# Patient Record
Sex: MALE | Race: WHITE | NOT HISPANIC OR LATINO | Employment: OTHER | URBAN - METROPOLITAN AREA
[De-identification: names, ages, dates, MRNs, and addresses within clinical notes are randomized per-mention and may not be internally consistent; named-entity substitution may affect disease eponyms.]

---

## 2017-04-03 ENCOUNTER — ALLSCRIPTS OFFICE VISIT (OUTPATIENT)
Dept: OTHER | Facility: OTHER | Age: 57
End: 2017-04-03

## 2017-05-24 ENCOUNTER — GENERIC CONVERSION - ENCOUNTER (OUTPATIENT)
Dept: OTHER | Facility: OTHER | Age: 57
End: 2017-05-24

## 2017-08-28 ENCOUNTER — ALLSCRIPTS OFFICE VISIT (OUTPATIENT)
Dept: OTHER | Facility: OTHER | Age: 57
End: 2017-08-28

## 2017-11-03 ENCOUNTER — ALLSCRIPTS OFFICE VISIT (OUTPATIENT)
Dept: OTHER | Facility: OTHER | Age: 57
End: 2017-11-03

## 2017-11-04 NOTE — CONSULTS
Assessment  1  GERD without esophagitis (530 81) (K21 9)   2  Dysphagia (787 20) (R13 10)   3  Chronic diarrhea (787 91) (K52 9)   4  Family history of colonic polyps (V18 51) (Z83 71) : Father   5  Rectal bleeding (569 3) (K62 5)    Plan  Chronic diarrhea, Dysphagia, GERD without esophagitis    · Follow Up After Tests Complete Evaluation and Treatment  Follow-up  Status: Hold For -  Scheduling  Requested for: 79GRS0388   Ordered; For: Chronic diarrhea, Dysphagia, GERD without esophagitis; Ordered By: Ana Zuniga Performed:  Due: 65APO9582   · EGD; Status:Hold For - Scheduling; Requested GZY:34QYQ4465;    Perform:[facility]; Order Comments:west; AMELIAZ:[id number];MGIESKN; For:Chronic diarrhea, Dysphagia, GERD without esophagitis; Ordered By:Kriss Kumari;  Chronic diarrhea, Rectal bleeding    · MoviPrep 100 GM Oral Solution Reconstituted; USE AS DIRECTED   Rx By: Ana Zuniga; Dispense: 0 Days ; #:1 Solution Reconstituted; Refill: 0;For: Chronic diarrhea, Rectal bleeding; CHRISTINA = N; Verified Transmission to [address] ; Last Updated By: System, [email or web address]; 11/3/2017 3:47:31 PM   · COLONOSCOPY (GI, SURG); Status:Hold For - Scheduling; Requested MID:29LBQ8117;    Perform:[facility]; Order Comments:west; RAC:[id number]; Ordered; For:Chronic diarrhea, Rectal bleeding; Ordered By:Kriss Kumari;    Discussion/Summary  Discussion Summary:   He presents for evaluation because of reflux, dysphagia, chronic diarrhea, and rectal bleeding  Since his last upper endoscopy and colonoscopy were about 8 years ago, I will schedule her for an upper endoscopy and colonoscopy to evaluate this further  During the procedures I will biopsies duodenum for celiac sprue in his colon for microscopic colitis  I spoke to him about the benefits and risks of the procedures as well as instructions for the bowel preparation and answered all of his questions        Chief Complaint  Chief Complaint Free Text Note Form: Pt consult for colon; complains of occasional diarrhea and reflux  History of Present Illness  HPI: He presents for evaluation because of chronic reflux symptoms and intermittent diarrhea  He is with his recent work has been managed with over-the-counter antacids but he has not been taking irregular proton pump inhibitor  He has also had a few episodes of dysphagia that is resolved on its own  He denies nausea, vomiting, abdominal pain, constipation, bleeding, and weight loss  He complains of intermittent diarrhea that seemed to be related to dietary changes such as lactose exposure  He believes his last upper endoscopy and colonoscopy were unremarkable about 8 years ago  History Reviewed: The history was obtained today from the patient and I agree with the documented history  Review of Systems  Complete-Male GI Adult:   Constitutional: No fever or chills, feels well, no tiredness, no recent weight gain or weight loss  Eyes: No complaints of eye pain, no red eyes, no discharge from eyes, no itchy eyes  ENT: no complaints of earache, no hearing loss, no nosebleeds, no nasal discharge, no sore throat, no hoarseness  Cardiovascular: No complaints of slow heart rate, no fast heart rate, no chest pain, no palpitations, no leg claudication, no lower extremity  Respiratory: No complaints of shortness of breath, no wheezing, no cough, no SOB on exertion, no orthopnea or PND  Gastrointestinal: diarrhea-- and-- reflux, but-- as noted in HPI  Genitourinary: No complaints of dysuria, no incontinence, no hesitancy, no nocturia, no genital lesion, no testicular pain  Musculoskeletal: No complaints of arthralgia, no myalgias, no joint swelling or stiffness, no limb pain or swelling  Integumentary: No complaints of skin rash or skin lesions, no itching, no skin wound, no dry skin     Neurological: No compliants of headache, no confusion, no convulsions, no numbness or tingling, no dizziness or fainting, no limb weakness, no difficulty walking  Psychiatric: Is not suicidal, no sleep disturbances, no anxiety or depression, no change in personality, no emotional problems  Endocrine: No complaints of proptosis, no hot flashes, no muscle weakness, no erectile dysfunction, no deepening of the voice, no feelings of weakness  Hematologic/Lymphatic: No complaints of swollen glands, no swollen glands in the neck, does not bleed easily, no easy bruising  ROS Reviewed:   ROS reviewed  Active Problems  1  Allergic rhinitis (477 9) (J30 9)   2  Circadian rhythm sleep disorder, shift work type (327 36) (G47 26)   3  Dizziness (780 4) (R42)   4  Encounter for prostate cancer screening (V76 44) (Z12 5)   5  Encounter for screening colonoscopy (V76 51) (Z12 11)   6  Polyneuropathy, idiopathic, progressive (356 4) (G60 3)   7  Screening for lipoid disorders (V77 91) (Z13 220)    Past Medical History  1  History of Carrier Of STD (V02 8)   2  History of allergy (V15 09) (Z88 9)   3  Denied: History of Mental Status Change  Active Problems And Past Medical History Reviewed: The active problems and past medical history were reviewed and updated today  Surgical History  1  History of Tonsillectomy With Adenoidectomy  Surgical History Reviewed: The surgical history was reviewed and updated today  Family History  Father    1  Family history of colonic polyps (V18 51) (Z83 71)  Sister    2  Family history of Type 2 Diabetes Mellitus  Family History Reviewed: The family history was reviewed and updated today  Social History   · Exercising Regularly   · Marital History - Currently    · Never A Smoker   · Never Used Drugs   · Occupation:  Social History Reviewed: The social history was reviewed and updated today  Current Meds   1  Multi Vitamin Mens Oral Tablet; Therapy: 58JHA0449 to Recorded   2  SM Calcium-Vitamin D 600-400 MG-UNIT Oral Tablet; Therapy: 42AKS3126 to Recorded   3   ValACYclovir HCl - 500 MG Oral Tablet; TAKE 1 TABLET DAILY AS DIRECTED; Therapy: 39Tsy2848 to (Evaluate:62Jno9404)  Requested for: 56Vgp8865; Last   Rx:92Uym3899 Ordered  Medication List Reviewed: The medication list was reviewed and updated today  Allergies  1  No Known Drug Allergies    Vitals  Vital Signs    Recorded: 34XJQ7254 03:12PM   Temperature 98 F, Tympanic   Heart Rate 85   Systolic 846, LUE, Sitting   Diastolic 78, LUE, Sitting   BP CUFF SIZE Large   Height 5 ft 9 in   Weight 201 lb 2 oz   BMI Calculated 29 7   BSA Calculated 2 07   O2 Saturation 98, RA     Physical Exam    Constitutional   General appearance: No acute distress, well appearing and well nourished  Eyes   Conjunctiva and lids: No swelling, erythema, or discharge  Pupils and irises: Equal, round and reactive to light  Ears, Nose, Mouth, and Throat   External inspection of ears and nose: Normal     Nasal mucosa, septum, and turbinates: Normal without edema or erythema  Oropharynx: Normal with no erythema, edema, exudate or lesions  Pulmonary   Respiratory effort: No increased work of breathing or signs of respiratory distress  Auscultation of lungs: Clear to auscultation, equal breath sounds bilaterally, no wheezes, no rales, no rhonci  Cardiovascular   Auscultation of heart: Normal rate and rhythm, normal S1 and S2, without murmurs  Examination of extremities for edema and/or varicosities: Normal     Abdomen   Abdomen: Non-tender, no masses  Liver and spleen: No hepatomegaly or splenomegaly  Lymphatic   Palpation of lymph nodes in neck: No lymphadenopathy  Skin   Skin and subcutaneous tissue: Normal without rashes or lesions  Neurologic   Cranial nerves: Cranial nerves 2-12 intact  Reflexes: 2+ and symmetric  Sensation: No sensory loss      Psychiatric   Orientation to person, place and time: Normal     Mood and affect: Normal          Signatures   Electronically signed by : Jai Carmona MD; Nov  3 2017 3:48PM EST                       (Author)

## 2017-11-07 ENCOUNTER — ANESTHESIA EVENT (OUTPATIENT)
Dept: PERIOP | Facility: AMBULARY SURGERY CENTER | Age: 57
End: 2017-11-07
Payer: COMMERCIAL

## 2017-12-05 ENCOUNTER — HOSPITAL ENCOUNTER (OUTPATIENT)
Facility: AMBULARY SURGERY CENTER | Age: 57
Setting detail: OUTPATIENT SURGERY
Discharge: HOME | End: 2017-12-05
Attending: INTERNAL MEDICINE | Admitting: INTERNAL MEDICINE
Payer: COMMERCIAL

## 2017-12-05 ENCOUNTER — ANESTHESIA (OUTPATIENT)
Dept: PERIOP | Facility: AMBULARY SURGERY CENTER | Age: 57
End: 2017-12-05
Payer: COMMERCIAL

## 2017-12-05 ENCOUNTER — GENERIC CONVERSION - ENCOUNTER (OUTPATIENT)
Dept: GASTROENTEROLOGY | Facility: CLINIC | Age: 57
End: 2017-12-05

## 2017-12-05 VITALS
OXYGEN SATURATION: 100 % | HEIGHT: 68 IN | SYSTOLIC BLOOD PRESSURE: 129 MMHG | TEMPERATURE: 97 F | BODY MASS INDEX: 29.7 KG/M2 | WEIGHT: 196 LBS | DIASTOLIC BLOOD PRESSURE: 85 MMHG | RESPIRATION RATE: 20 BRPM | HEART RATE: 66 BPM

## 2017-12-05 DIAGNOSIS — K52.9 CHRONIC DIARRHEA: ICD-10-CM

## 2017-12-05 DIAGNOSIS — R13.10 DYSPHAGIA: ICD-10-CM

## 2017-12-05 DIAGNOSIS — K21.9 GERD WITHOUT ESOPHAGITIS: ICD-10-CM

## 2017-12-05 PROCEDURE — 88305 TISSUE EXAM BY PATHOLOGIST: CPT | Performed by: INTERNAL MEDICINE

## 2017-12-05 RX ORDER — SODIUM CHLORIDE, SODIUM LACTATE, POTASSIUM CHLORIDE, CALCIUM CHLORIDE 600; 310; 30; 20 MG/100ML; MG/100ML; MG/100ML; MG/100ML
INJECTION, SOLUTION INTRAVENOUS CONTINUOUS PRN
Status: DISCONTINUED | OUTPATIENT
Start: 2017-12-05 | End: 2017-12-05 | Stop reason: SURG

## 2017-12-05 RX ORDER — PROPOFOL 10 MG/ML
INJECTION, EMULSION INTRAVENOUS AS NEEDED
Status: DISCONTINUED | OUTPATIENT
Start: 2017-12-05 | End: 2017-12-05 | Stop reason: SURG

## 2017-12-05 RX ORDER — OMEPRAZOLE 20 MG/1
20 CAPSULE, DELAYED RELEASE ORAL
Qty: 30 CAPSULE | Refills: 5 | Status: SHIPPED | OUTPATIENT
Start: 2017-12-05 | End: 2021-09-14

## 2017-12-05 RX ORDER — VALACYCLOVIR HYDROCHLORIDE 500 MG/1
500 TABLET, FILM COATED ORAL 2 TIMES DAILY
COMMUNITY
End: 2018-05-01 | Stop reason: SDUPTHER

## 2017-12-05 RX ORDER — MULTIVITAMIN WITH IRON
1 TABLET ORAL DAILY
COMMUNITY

## 2017-12-05 RX ORDER — DIPHENOXYLATE HYDROCHLORIDE AND ATROPINE SULFATE 2.5; .025 MG/1; MG/1
1 TABLET ORAL DAILY
COMMUNITY
End: 2021-09-14 | Stop reason: SDUPTHER

## 2017-12-05 RX ORDER — LIDOCAINE HYDROCHLORIDE 10 MG/ML
INJECTION, SOLUTION INFILTRATION; PERINEURAL AS NEEDED
Status: DISCONTINUED | OUTPATIENT
Start: 2017-12-05 | End: 2017-12-05 | Stop reason: SURG

## 2017-12-05 RX ADMIN — PROPOFOL 50 MG: 10 INJECTION, EMULSION INTRAVENOUS at 08:57

## 2017-12-05 RX ADMIN — PROPOFOL 50 MG: 10 INJECTION, EMULSION INTRAVENOUS at 08:48

## 2017-12-05 RX ADMIN — LIDOCAINE HYDROCHLORIDE 50 MG: 10 INJECTION, SOLUTION INFILTRATION; PERINEURAL at 08:42

## 2017-12-05 RX ADMIN — PROPOFOL 50 MG: 10 INJECTION, EMULSION INTRAVENOUS at 09:03

## 2017-12-05 RX ADMIN — PROPOFOL 50 MG: 10 INJECTION, EMULSION INTRAVENOUS at 09:00

## 2017-12-05 RX ADMIN — PROPOFOL 100 MG: 10 INJECTION, EMULSION INTRAVENOUS at 08:42

## 2017-12-05 RX ADMIN — PROPOFOL 50 MG: 10 INJECTION, EMULSION INTRAVENOUS at 08:51

## 2017-12-05 RX ADMIN — SODIUM CHLORIDE, SODIUM LACTATE, POTASSIUM CHLORIDE, AND CALCIUM CHLORIDE: .6; .31; .03; .02 INJECTION, SOLUTION INTRAVENOUS at 08:38

## 2017-12-05 RX ADMIN — PROPOFOL 50 MG: 10 INJECTION, EMULSION INTRAVENOUS at 09:06

## 2017-12-05 RX ADMIN — PROPOFOL 50 MG: 10 INJECTION, EMULSION INTRAVENOUS at 08:53

## 2017-12-05 RX ADMIN — PROPOFOL 50 MG: 10 INJECTION, EMULSION INTRAVENOUS at 08:45

## 2017-12-05 RX ADMIN — PROPOFOL 50 MG: 10 INJECTION, EMULSION INTRAVENOUS at 09:08

## 2017-12-05 RX ADMIN — PROPOFOL 50 MG: 10 INJECTION, EMULSION INTRAVENOUS at 09:11

## 2017-12-05 NOTE — DISCHARGE INSTRUCTIONS

## 2017-12-05 NOTE — ANESTHESIA PREPROCEDURE EVALUATION
Review of Systems/Medical History      No history of anesthetic complications     Cardiovascular  Negative cardio ROS   Comment: Per pt, recent negative stress test,  Pulmonary  ,   Comment: Former smoker     GI/Hepatic    GERD ,        Negative  ROS        Endo/Other  Negative endo/other ROS      GYN       Hematology  Negative hematology ROS      Musculoskeletal  Negative musculoskeletal ROS        Neurology  Negative neurology ROS      Psychology           Physical Exam    Airway       Dental       Cardiovascular  Comment: Negative ROS,     Pulmonary      Other Findings        Anesthesia Plan  ASA Score- 2       Anesthesia Type- IV sedation with anesthesia with ASA Monitors  Additional Monitors:   Airway Plan:     Comment: IV sedation,  standard ASA monitors  Risks and benefits discussed with patient; patient consented and agrees to proceed  I saw and evaluated the patient  If seen with CRNA, we have discussed the anesthetic plan and I am in agreement that the plan is appropriate for the patient          Induction- intravenous  Informed Consent- Anesthetic plan and risks discussed with patient

## 2017-12-05 NOTE — H&P
History and Physical - [facility]  Nnifa Solis 49 y o  male MRN: [id number]                  HPI: Ninfa Solis is a 49y o  year old male who presents for dysphagia, abdominal pain, diarrhea, and rectal bleeding  REVIEW OF SYSTEMS: Per the HPI, and otherwise unremarkable  Historical Information   Past Medical History:   Diagnosis Date    Shingles      Past Surgical History:   Procedure Laterality Date    TONSILLECTOMY       Social History   History   Alcohol Use    Yes     Comment: once a week     History   Drug Use No     History   Smoking Status    Former Smoker   Smokeless Tobacco    Never Used     History reviewed  No pertinent family history  Meds/Allergies     Prescriptions Prior to Admission   Medication    B Complex-C (B-COMPLEX WITH VITAMIN C) tablet    famotidine (PEPCID AC) 10 MG chewable tablet    multivitamin (THERAGRAN) TABS    valACYclovir (VALTREX) 500 mg tablet       No Known Allergies    Objective     Blood pressure 144/89, pulse 82, temperature 97 6 °F (36 4 °C), temperature source Temporal, resp  rate 20, height 5' 8" (1 727 m), weight 88 9 kg (196 lb), SpO2 98 %  PHYSICAL EXAM    Gen: NAD  CV: RRR  CHEST: Clear  ABD: soft, NT/ND  EXT: no edema  Neuro: AAO      ASSESSMENT/PLAN:  This is a 49y o  year old male here for dysphagia, abdominal pain, diarrhea, and rectal bleeding  PLAN:   Procedure:  Upper endoscopy and colonoscopy

## 2017-12-05 NOTE — OP NOTE
OPERATIVE REPORT  PATIENT NAME: Lyndon Wakefield    :  1960  MRN: [id number]  Pt Location: [facility]  GI ROOM 01    SURGERY DATE: 2017    Surgeon(s) and Role:     * Faizan Powell MD - Primary    ESOPHAGOGASTRODUODENOSCOPY    PROCEDURE: EGD    SEDATION: Monitored anesthesia care, check anesthesia records    ASA Class: 2    INDICATIONS:  Reflux and dysphagia    CONSENT:  Informed consent was obtained for the procedure, including sedation after explaining the risks and benefits of the procedure  Risks including but not limited to bleeding, perforation, infection, and missed lesion  PREPARATION:   Telemetry, pulse oximetry, blood pressure were monitored throughout the procedure  Patient was identified by myself both verbally and by visual inspection of ID band  DESCRIPTION:   Patient was placed in the left lateral decubitus position and was sedated with the above medication  The gastroscope was introduced in to the oropharynx and the esophagus was intubated under direct visualization  Scope was passed down the esophagus up to 2nd part of the duodenum  A careful inspection was made as the gastroscope was withdrawn, including a retroflexed view of the stomach; findings and interventions are described below  FINDINGS:    #1  Esophagus-there were two polyps in the esophagus that were biopsied  Random biopsies were taken from the proximal esophagus to rule out eosinophilic esophagitis  LA class B erosive reflux esophagitis was noted in the distal esophagus as well as possible Zurita's esophagus which was biopsied  #2Louvella Laser is a small hiatal hernia  Retroflexed view of the stomach was unremarkable  #3  Duodenum-normal   Random biopsies were taken from the duodenum to rule out celiac sprue           IMPRESSIONS:      Esophageal polyps  Reflux esophagitis  Possible Zurita's esophagus  Hiatal hernia    RECOMMENDATIONS:     Await pathology results  Colonoscopy to follow  Take omeprazole twice a day    COMPLICATIONS:  None; patient tolerated the procedure well            DISPOSITION: PACU           CONDITION: Stable      SIGNATURE: Juventino Johnson MD  DATE: December 5, 2017  TIME: 9:23 AM

## 2017-12-05 NOTE — ANESTHESIA POSTPROCEDURE EVALUATION
Post-Op Assessment Note      CV Status:  Stable    Mental Status:  Alert and awake    Hydration Status:  Euvolemic    PONV Controlled:  Controlled    Airway Patency:  Patent    Post Op Vitals Reviewed: Yes          Staff: Anesthesiologist       Comments: vss, report to rn          /57 (12/05/17 0928)    Temp     Pulse 83 (12/05/17 0928)   Resp 18 (12/05/17 0928)    SpO2 97 % (12/05/17 0928)

## 2017-12-05 NOTE — OP NOTE
OPERATIVE REPORT  PATIENT NAME: Gerry Osorio    :  1960  MRN: [id number]  Pt Location: [facility]  GI ROOM 01    SURGERY DATE: 2017    Surgeon(s) and Role:     * Ni Yates MD - Primary    Colonoscopy Procedure Note    Procedure: Colonoscopy    Sedation: Monitored anesthesia care, check anesthesia records      ASA Class: 2    INDICATIONS:  Family history of colon cancer, chronic diarrhea, and rectal bleeding    POST-OP DIAGNOSIS: See the impression below    Procedure Details     Prior colonoscopy: No prior colonoscopy  Informed consent was obtained for the procedure, including sedation  Risks of perforation, hemorrhage, adverse drug reaction and aspiration were discussed  The patient was placed in the left lateral decubitus position  Based on the pre-procedure assessment, including review of the patient's medical history, medications, allergies, and review of systems, he had been deemed to be an appropriate candidate for conscious sedation; he was therefore sedated with the medications listed below  The patient was monitored continuously with telemetry, pulse oximetry, blood pressure monitoring, and direct observations  A rectal examination was performed  The colonoscope was inserted into the rectum and advanced under direct vision to the cecum, which was identified by the ileocecal valve and appendiceal orifice  The quality of the colonic preparation was good  A careful inspection was made as the colonoscope was withdrawn, including a retroflexed view of the rectum; findings and interventions are described below  Findings:  Diverticulosis was seen throughout the colon  There was a possible inverted diverticulum versus polyp in the transverse colon that was biopsied  A 6 mm sessile polyp was removed from the sigmoid colon with cold snare polypectomy  Retroflexed view was unremarkable in the rectum             Complications:  None; patient tolerated the procedure well     Impression:    Diverticulosis  Colon polyp  Inverted diverticulum versus colon polyp in the transverse colon    Recommendations:  Repeat colonoscopy in 5 years or sooner if clinically indicated  Repeat colonoscopy is being recommended at an interval of less than 10 years, this is because of family history of colon cancer      SIGNATURE: Mali Matias MD  DATE: December 5, 2017  TIME: 9:28 AM

## 2017-12-09 ENCOUNTER — GENERIC CONVERSION - ENCOUNTER (OUTPATIENT)
Dept: OTHER | Facility: OTHER | Age: 57
End: 2017-12-09

## 2017-12-19 ENCOUNTER — GENERIC CONVERSION - ENCOUNTER (OUTPATIENT)
Dept: OTHER | Facility: OTHER | Age: 57
End: 2017-12-19

## 2018-01-12 NOTE — MISCELLANEOUS
Message  GI Reminder Recall [facility]:   Date: 05/24/2017   Dear Zuri Roman:     Review of our records shows you are due for the following: Colonoscopy  Our records indicate that you are due at this time to have a follow-up examination for a colonoscopy  As you now, these tests are done to prevent colon cancer, a very common disease in the [place] and responsible for the thousands of patient deaths each year  We at [facility] are concerned for your health, and would very much appreciate you getting in touch with us at your earliest convenience, Again, this examination is vital to your proper health maintenance and for the prevention of cancer  We have been trying to contact you and have been unsuccessful  Please contact our office at your earliest convenience to schedule your procedure  Thank you       Please call the following office to schedule your appointment:   [address] [place] [address], Delmy [address] [phone]  We look forward to hearing from you!      Sincerely,         Signatures   Electronically signed by : David Flmeing, ; May 24 2017  8:53AM EST                       (Author)

## 2018-01-13 VITALS
BODY MASS INDEX: 29.79 KG/M2 | TEMPERATURE: 98 F | HEART RATE: 85 BPM | WEIGHT: 201.13 LBS | DIASTOLIC BLOOD PRESSURE: 78 MMHG | HEIGHT: 69 IN | SYSTOLIC BLOOD PRESSURE: 120 MMHG | OXYGEN SATURATION: 98 %

## 2018-01-14 VITALS
HEIGHT: 69 IN | BODY MASS INDEX: 30.21 KG/M2 | SYSTOLIC BLOOD PRESSURE: 124 MMHG | DIASTOLIC BLOOD PRESSURE: 68 MMHG | WEIGHT: 204 LBS

## 2018-01-14 VITALS
BODY MASS INDEX: 29.23 KG/M2 | HEIGHT: 69 IN | DIASTOLIC BLOOD PRESSURE: 80 MMHG | SYSTOLIC BLOOD PRESSURE: 130 MMHG | WEIGHT: 197.38 LBS | TEMPERATURE: 98.2 F

## 2018-01-23 NOTE — RESULT NOTES
Verified Results  (1) TISSUE EXAM [id number] 08:46AM Lenin Lucas     Test Name Result Flag Reference   LAB AP CASE REPORT (Report)     Surgical Pathology Report             Case: [id number]                   Authorizing Provider: Kulwant Arias MD      Collected:      12/05/2017 0846        Ordering Location:   [facility]    Received:      12/06/2017 [address]                             Pathologist:      Kumar Lemon MD                             Specimens:  A) - Duodenum, duodenum bx                                       B) - Esophagus, distal esophagus bx                                  C) - Esophagus, proximal esophagus bx                                 D) - Polyp, Esophageal, esophageal polyps                               E) - Colon, transverse colon bx                                    F) - Polyp, Stomach/Small Intestine, sigmoid polyp cold snare   LAB AP FINAL DIAGNOSIS (Report)     A  Duodenum, biopsy:  - Benign duodenal mucosa  - No villous atrophy, intraepithelial lymphocytosis or crypt hyperplasia;   negative for features of malabsorptive enteropathy   - Negative for chronic or active duodenitis, dysplasia or malignancy  B  Esophagus, distal, biopsy:  - Gastroesophageal junctional mucosa with mixed inflammation and mild   reactive change consistent with gastroesophagitis  - Negative for intestinal metaplasia (Zurita's esophagus), dysplasia or   carcinoma  C  Esophagus, proximal, biopsy:  - Esophageal squamous mucosa with increased intraepithelial eosinophils   (up to 10 per high-power field) (see note)  - Negative for dysplasia or carcinoma  D  Esophagus, polyps, biopsies:  - Portions of reactive squamous mucosa with mild inflammation and   scattered eosinophils, negative for distinct features of polyp   - Negative for dysplasia or carcinoma      E  Colon, transverse, biopsy:  - Portion of reactive colonic mucosa with increased inflammation and   lymphoid aggregates, cannot exclude small inflammatory pseudopolyp   - Negative for aspirate or carcinoma  F  Colon, sigmoid polyp, biopsy:   - Tubular adenoma, negative for high-grade dysplasia  Electronically signed by Angela Perry MD on 12/8/2017 at 3:14 PM   LAB AP NOTE (Report)     The proximal esophagus biopsy shows reactive squamous mucosa with   increased intraepithelial eosinophils (up to 10 per high-power field)  The   finding raises a suspicion for eosinophilic esophagitis, though was not   definitively diagnostic  Alternative etiologies for increased   intraepithelial eosinophils may include drug effect, reflux esophagitis,   infection, or allergy  Correlation with clinical progression is   recommended  LAB AP SURGICAL ADDITIONAL INFORMATION (Report)     All controls performed with the immunohistochemical stains reported above   reacted appropriately  These tests were developed and their performance   characteristics determined by [facility] or   [facility]  They may not be cleared or approved by the [place]  Food and Drug Administration  The FDA has determined that such clearance   or approval is not necessary  These tests are used for clinical purposes  They should not be regarded as investigational or for research  This   laboratory has been approved by CLIA 88, designated as a high-complexity   laboratory and is qualified to perform these tests  LAB AP GROSS DESCRIPTION (Report)     A  The specimen is received in formalin, labeled with the patient's name   and hospital number, and is designated duodenum biopsy, are two   irregularly shaped fragments of tan soft tissue measuring 0 3 and 0 2 cm   in greatest dimension  Entirely submitted  One cassette  B   The specimen is received in formalin, labeled with the patient's name   and hospital number, and is designated Distal esophagus biopsy, is a   single irregularly shaped fragment of tan soft tissue measuring 0 4 cm in   greatest dimension  Entirely submitted  One cassette  C  The specimen is received in formalin, labeled with the patient's name   and hospital number, and is designated Proximal esophagus biopsy, are   two irregularly shaped fragments of tan soft tissue measuring 0 3 and 0 2   cm in greatest dimension  Entirely submitted  One cassette  D  The specimen is received in formalin, labeled with the patient's name   and hospital number, and is designated Esophageal polyp, is a single   irregularly shaped fragment of tan soft tissue measuring 0 4 cm in   greatest dimension  Entirely submitted  One cassette  E  The specimen is received in formalin, labeled with the patient's name   and hospital number, and is designated Transverse colon biopsy, are two   irregularly shaped fragments of tan soft tissue measuring 0 3 and 0 2 cm   in greatest dimension  Entirely submitted  One cassette  F  The specimen is received in formalin, labeled with the patient's name   and hospital number, and is designated Sigmoid polyp cold snare, is a   single irregularly shaped fragment of tan soft tissue measuring 0 3 cm in   greatest dimension  Entirely submitted  One cassette  Note: The estimated total formalin fixation time based upon information   provided by the submitting clinician and the standard processing schedule   is less than 72 hours      Justice BAEZA AP CLINICAL INFORMATION R/o celiac     R/o celiac

## 2018-01-24 VITALS
HEART RATE: 74 BPM | DIASTOLIC BLOOD PRESSURE: 82 MMHG | TEMPERATURE: 98 F | HEIGHT: 69 IN | SYSTOLIC BLOOD PRESSURE: 130 MMHG | BODY MASS INDEX: 29.92 KG/M2 | WEIGHT: 202 LBS | OXYGEN SATURATION: 98 %

## 2018-05-01 ENCOUNTER — OFFICE VISIT (OUTPATIENT)
Dept: FAMILY MEDICINE CLINIC | Facility: CLINIC | Age: 58
End: 2018-05-01
Payer: COMMERCIAL

## 2018-05-01 VITALS
SYSTOLIC BLOOD PRESSURE: 118 MMHG | OXYGEN SATURATION: 94 % | HEART RATE: 93 BPM | WEIGHT: 205 LBS | BODY MASS INDEX: 30.36 KG/M2 | DIASTOLIC BLOOD PRESSURE: 72 MMHG | HEIGHT: 69 IN | TEMPERATURE: 98.4 F

## 2018-05-01 DIAGNOSIS — B00.1 COLD SORE: ICD-10-CM

## 2018-05-01 DIAGNOSIS — K21.9 GERD WITHOUT ESOPHAGITIS: ICD-10-CM

## 2018-05-01 DIAGNOSIS — G47.26 CIRCADIAN RHYTHM SLEEP DISORDER, SHIFT WORK TYPE: Primary | ICD-10-CM

## 2018-05-01 PROCEDURE — 1036F TOBACCO NON-USER: CPT | Performed by: FAMILY MEDICINE

## 2018-05-01 PROCEDURE — 99214 OFFICE O/P EST MOD 30 MIN: CPT | Performed by: FAMILY MEDICINE

## 2018-05-01 PROCEDURE — 3008F BODY MASS INDEX DOCD: CPT | Performed by: FAMILY MEDICINE

## 2018-05-01 RX ORDER — ARMODAFINIL 150 MG/1
150 TABLET ORAL DAILY
Qty: 30 TABLET | Refills: 2 | Status: SHIPPED | OUTPATIENT
Start: 2018-05-01 | End: 2018-05-02 | Stop reason: CLARIF

## 2018-05-01 RX ORDER — DIPHENOXYLATE HYDROCHLORIDE AND ATROPINE SULFATE 2.5; .025 MG/1; MG/1
1 TABLET ORAL
COMMUNITY

## 2018-05-01 RX ORDER — FLUTICASONE PROPIONATE 50 MCG
2 SPRAY, SUSPENSION (ML) NASAL AS NEEDED
COMMUNITY

## 2018-05-01 RX ORDER — VALACYCLOVIR HYDROCHLORIDE 1 G/1
1000 TABLET, FILM COATED ORAL DAILY
Qty: 90 TABLET | Refills: 1 | Status: SHIPPED | OUTPATIENT
Start: 2018-05-01 | End: 2018-12-04 | Stop reason: SDUPTHER

## 2018-05-01 NOTE — ASSESSMENT & PLAN NOTE
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

## 2018-05-01 NOTE — PROGRESS NOTES
Assessment/Plan:    Circadian rhythm sleep disorder, shift work type  Continues to have problems with sleeping even though he is no longer doing shift work  Would like to try medication to help  Cold sore    Currently on 500 mg of fell psych liver daily  Still having breakouts  Will increase to 1 g daily  Diagnoses and all orders for this visit:    Circadian rhythm sleep disorder, shift work type  -     Armodafinil (NUVIGIL) 150 MG tablet; Take 1 tablet (150 mg total) by mouth daily for 90 days  -     CBC  -     Comprehensive metabolic panel  -     Lipid Panel With Direct LDL  -     TSH, 3rd generation    Cold sore  -     valACYclovir (VALTREX) 1,000 mg tablet; Take 1 tablet (1,000 mg total) by mouth daily for 180 days    GERD without esophagitis    Other orders  -     Calcium Carbonate-Vitamin D3 (SM CALCIUM-VITAMIN D) 600-400 MG-UNIT TABS; Take by mouth  -     fluticasone (FLONASE) 50 mcg/act nasal spray; 2 sprays into each nostril  -     multivitamin (THERAGRAN) TABS; Take 1 tablet by mouth          Subjective:      Patient ID: Lyndon Wakefield is a 56 y o  male  Patient presents with: Follow-up: f/u on insomnia requests to take the medication nuvigil once daily 250mg to stay alert during the day and cut out caffine  Medication Refill            The following portions of the patient's history were reviewed and updated as appropriate: allergies, current medications, past family history, past medical history, past social history, past surgical history and problem list     Review of Systems   Constitutional: Negative  HENT: Negative  Eyes: Negative  Respiratory: Negative  Cardiovascular: Negative  Gastrointestinal: Negative  Endocrine: Negative  Genitourinary: Negative  Musculoskeletal: Negative  Skin: Negative  Allergic/Immunologic: Negative  Neurological: Negative  Hematological: Negative  Psychiatric/Behavioral: Negative      All other systems reviewed and are negative  Objective:      /72   Pulse 93   Temp 98 4 °F (36 9 °C)   Ht 5' 9" (1 753 m)   Wt 93 kg (205 lb)   SpO2 94%   BMI 30 27 kg/m²          Physical Exam   Constitutional: He is oriented to person, place, and time  He appears well-developed and well-nourished  HENT:   Head: Normocephalic and atraumatic  Right Ear: External ear normal    Left Ear: External ear normal    Nose: Nose normal    Mouth/Throat: Oropharynx is clear and moist    Eyes: Conjunctivae and EOM are normal  Pupils are equal, round, and reactive to light  Neck: Normal range of motion  Neck supple  Cardiovascular: Normal rate, regular rhythm and normal heart sounds  Pulmonary/Chest: Effort normal and breath sounds normal    Abdominal: Soft  Bowel sounds are normal    Musculoskeletal: Normal range of motion  Neurological: He is alert and oriented to person, place, and time  He has normal reflexes  Skin: Skin is warm and dry  Psychiatric: He has a normal mood and affect  His behavior is normal    Nursing note and vitals reviewed

## 2018-05-02 ENCOUNTER — TELEPHONE (OUTPATIENT)
Dept: FAMILY MEDICINE CLINIC | Facility: CLINIC | Age: 58
End: 2018-05-02

## 2018-05-02 DIAGNOSIS — G47.26 CIRCADIAN RHYTHM SLEEP DISORDER, SHIFT WORK TYPE: Primary | ICD-10-CM

## 2018-05-02 RX ORDER — MODAFINIL 100 MG/1
100 TABLET ORAL DAILY
Qty: 90 TABLET | Refills: 1 | Status: SHIPPED | OUTPATIENT
Start: 2018-05-02 | End: 2018-05-04 | Stop reason: SDUPTHER

## 2018-05-02 NOTE — TELEPHONE ENCOUNTER
PT WAS PRESCRIBED (NUVIGIL) BUT IT WAS $550 00 AT THE PHARMACY - HE WOULD LIKE TO BE PRESCRIBED: (MODAFINIL)    THANK YOU TR

## 2018-05-03 ENCOUNTER — TELEPHONE (OUTPATIENT)
Dept: FAMILY MEDICINE CLINIC | Facility: CLINIC | Age: 58
End: 2018-05-03

## 2018-05-04 ENCOUNTER — TELEPHONE (OUTPATIENT)
Dept: FAMILY MEDICINE CLINIC | Facility: CLINIC | Age: 58
End: 2018-05-04

## 2018-05-04 DIAGNOSIS — G47.26 CIRCADIAN RHYTHM SLEEP DISORDER, SHIFT WORK TYPE: ICD-10-CM

## 2018-05-04 RX ORDER — MODAFINIL 100 MG/1
100 TABLET ORAL DAILY
Qty: 90 TABLET | Refills: 0 | OUTPATIENT
Start: 2018-05-04 | End: 2021-09-14

## 2018-05-04 NOTE — TELEPHONE ENCOUNTER
PT WOULD LIKE TO HAVE HIS SCRIPT THAT WAS SENT TO [facility] FROM DR AVENDAÑO NOW WENT SENT TO [facility] NOW BECAUSE IT IS CHEAPER YET $60 00 - MEDICATION IS  (MODAFINIL) - PT IS GOING TO A  THIS WEEKEND AND WOULD LIKE TO HAVE THE SCRIPT SENT TODAY IF POSSIBLE  THANK YOU   TR

## 2018-12-04 DIAGNOSIS — B00.1 COLD SORE: ICD-10-CM

## 2018-12-04 RX ORDER — VALACYCLOVIR HYDROCHLORIDE 1 G/1
1000 TABLET, FILM COATED ORAL DAILY
Qty: 90 TABLET | Refills: 1 | Status: SHIPPED | OUTPATIENT
Start: 2018-12-04 | End: 2021-09-14

## 2019-10-08 DIAGNOSIS — B00.1 COLD SORE: ICD-10-CM

## 2019-10-08 RX ORDER — VALACYCLOVIR HYDROCHLORIDE 1 G/1
TABLET, FILM COATED ORAL
Qty: 90 TABLET | Refills: 1 | OUTPATIENT
Start: 2019-10-08

## 2021-09-14 ENCOUNTER — OFFICE VISIT (OUTPATIENT)
Dept: INTERNAL MEDICINE CLINIC | Facility: CLINIC | Age: 61
End: 2021-09-14
Payer: COMMERCIAL

## 2021-09-14 VITALS
HEIGHT: 69 IN | DIASTOLIC BLOOD PRESSURE: 78 MMHG | HEART RATE: 82 BPM | WEIGHT: 188.2 LBS | OXYGEN SATURATION: 98 % | TEMPERATURE: 98.2 F | RESPIRATION RATE: 18 BRPM | SYSTOLIC BLOOD PRESSURE: 108 MMHG | BODY MASS INDEX: 27.88 KG/M2

## 2021-09-14 DIAGNOSIS — Z11.59 NEED FOR HEPATITIS C SCREENING TEST: ICD-10-CM

## 2021-09-14 DIAGNOSIS — Z13.220 ENCOUNTER FOR LIPID SCREENING FOR CARDIOVASCULAR DISEASE: ICD-10-CM

## 2021-09-14 DIAGNOSIS — K21.9 GERD WITHOUT ESOPHAGITIS: ICD-10-CM

## 2021-09-14 DIAGNOSIS — Z12.5 PROSTATE CANCER SCREENING: ICD-10-CM

## 2021-09-14 DIAGNOSIS — Z00.00 ANNUAL PHYSICAL EXAM: Primary | ICD-10-CM

## 2021-09-14 DIAGNOSIS — Z13.6 ENCOUNTER FOR LIPID SCREENING FOR CARDIOVASCULAR DISEASE: ICD-10-CM

## 2021-09-14 PROBLEM — G47.26 CIRCADIAN RHYTHM SLEEP DISORDER, SHIFT WORK TYPE: Status: RESOLVED | Noted: 2017-04-03 | Resolved: 2021-09-14

## 2021-09-14 PROBLEM — B00.1 COLD SORE: Status: RESOLVED | Noted: 2018-05-01 | Resolved: 2021-09-14

## 2021-09-14 PROCEDURE — 3008F BODY MASS INDEX DOCD: CPT | Performed by: INTERNAL MEDICINE

## 2021-09-14 PROCEDURE — 3725F SCREEN DEPRESSION PERFORMED: CPT | Performed by: INTERNAL MEDICINE

## 2021-09-14 PROCEDURE — 99386 PREV VISIT NEW AGE 40-64: CPT | Performed by: INTERNAL MEDICINE

## 2021-09-14 RX ORDER — OMEPRAZOLE 20 MG/1
20 CAPSULE, DELAYED RELEASE ORAL DAILY PRN
Qty: 30 CAPSULE | Refills: 5
Start: 2021-09-14

## 2021-09-14 NOTE — ASSESSMENT & PLAN NOTE
Discussed continuing lifestyle modification with diet exercise  Will order CBC, CMP, lipid panel, TSH for screening  Will order a PSA for prostate cancer screening  Will order hepatitis C antibody for hepatitis-C screening  He is up-to-date on colorectal cancer screening

## 2021-09-14 NOTE — PROGRESS NOTES
Assessment/Plan:    Annual physical exam  Discussed continuing lifestyle modification with diet exercise  Will order CBC, CMP, lipid panel, TSH for screening  Will order a PSA for prostate cancer screening  Will order hepatitis C antibody for hepatitis-C screening  He is up-to-date on colorectal cancer screening  Diagnoses and all orders for this visit:    Annual physical exam  -     CBC; Future  -     Comprehensive metabolic panel; Future  -     Lipid panel; Future  -     PSA, Total Screen; Future  -     TSH, 3rd generation with Free T4 reflex; Future    GERD without esophagitis  -     omeprazole (PriLOSEC) 20 mg delayed release capsule; Take 1 capsule (20 mg total) by mouth daily as needed (acid reflux)    Encounter for lipid screening for cardiovascular disease  -     CBC; Future  -     Comprehensive metabolic panel; Future  -     Lipid panel; Future  -     TSH, 3rd generation with Free T4 reflex; Future    Prostate cancer screening  -     PSA, Total Screen; Future    Need for hepatitis C screening test  -     Hepatitis C antibody; Future    Other orders  -     NON FORMULARY; 3 g 2 (two) times a day Gamma ammino butryc acid          BMI Counseling: Body mass index is 27 79 kg/m²  The BMI is above normal  Nutrition recommendations include encouraging healthy choices of fruits and vegetables, decreasing fast food intake, consuming healthier snacks, limiting drinks that contain sugar, moderation in carbohydrate intake, increasing intake of lean protein, reducing intake of saturated and trans fat and reducing intake of cholesterol  Exercise recommendations include moderate physical activity 150 minutes/week  No pharmacotherapy was ordered  Patient referred to PCP  Depression Screening and Follow-up Plan:   Patient was screened for depression during today's encounter  They screened negative with a PHQ-2 score of 0  Subjective:      Patient ID: Kathleen Santillan is a 64 y o  male      Chief Complaint   Patient presents with    Establish Care     last PCL Dr Laureen Newell     would like a physical, has not had BW for a few years so he would like some done    health maintenance     hep c, annual exam, phq, bmi f/u plan, bmi adult       64year old male is seen today to reestablish  He has a h o herpes labialis, GERD, and allergic rhinitis  He reports a family history of DM (sister)  Denies any FH of cancer  He denies current tobacco  Use (quit 20 years ago) or illicit drugs use  He drinks alcohol rarely  He follows a healthy diet and exercises regularly  He has no active complaints or concerns for today  The following portions of the patient's history were reviewed and updated as appropriate: allergies, current medications, past family history, past medical history, past social history, past surgical history and problem list     Review of Systems   Constitutional: Negative for activity change, appetite change, chills, diaphoresis, fatigue and fever  HENT: Negative for congestion, postnasal drip, rhinorrhea, sinus pressure, sinus pain, sneezing and sore throat  Eyes: Negative for visual disturbance  Respiratory: Negative for apnea, cough, choking, chest tightness, shortness of breath and wheezing  Cardiovascular: Negative for chest pain, palpitations and leg swelling  Gastrointestinal: Negative for abdominal distention, abdominal pain, anal bleeding, blood in stool, constipation, diarrhea, nausea and vomiting  Endocrine: Negative for cold intolerance and heat intolerance  Genitourinary: Negative for difficulty urinating, dysuria and hematuria  Musculoskeletal: Negative  Skin: Negative  Neurological: Negative for dizziness, weakness, light-headedness, numbness and headaches  Hematological: Negative for adenopathy  Psychiatric/Behavioral: Negative for agitation, sleep disturbance and suicidal ideas     All other systems reviewed and are negative  Past Medical History:   Diagnosis Date    Allergic     mild food sensativities    Allergy     Circadian rhythm sleep disorder, shift work type 4/3/2017    Cold sore 5/1/2018    Herpes     Shingles          Current Outpatient Medications:     B Complex-C (B-COMPLEX WITH VITAMIN C) tablet, Take 1 tablet by mouth daily, Disp: , Rfl:     Calcium Carbonate-Vitamin D3 (SM CALCIUM-VITAMIN D) 600-400 MG-UNIT TABS, Take by mouth, Disp: , Rfl:     fluticasone (FLONASE) 50 mcg/act nasal spray, 2 sprays into each nostril as needed , Disp: , Rfl:     multivitamin (THERAGRAN) TABS, Take 1 tablet by mouth, Disp: , Rfl:     NON FORMULARY, 3 g 2 (two) times a day Gamma ammino butryc acid, Disp: , Rfl:     omeprazole (PriLOSEC) 20 mg delayed release capsule, Take 1 capsule (20 mg total) by mouth daily as needed (acid reflux), Disp: 30 capsule, Rfl: 5    No Known Allergies    Social History   Past Surgical History:   Procedure Laterality Date    [place] ESOPHAGOGASTRODUODENOSCOPY TRANSORAL DIAGNOSTIC N/A 12/5/2017    Procedure: EGD AND COLONOSCOPY;  Surgeon: Surendra Romero MD;  [facility]; Service: Gastroenterology    TONSILLECTOMY      w/adenoidectomy     Family History   Problem Relation Age of Onset    Colon polyps Father     Diabetes type II Sister        Objective:  /78 (BP Location: Left arm, Patient Position: Sitting, Cuff Size: Large)   Pulse 82   Temp 98 2 °F (36 8 °C) (Temporal)   Resp 18   Ht 5' 9" (1 753 m)   Wt 85 4 kg (188 lb 3 2 oz)   SpO2 98%   BMI 27 79 kg/m²     No results found for this or any previous visit (from the past 1344 hour(s))  Physical Exam  Vitals and nursing note reviewed  Constitutional:       General: He is not in acute distress  Appearance: He is well-developed  He is not diaphoretic  HENT:      Head: Normocephalic and atraumatic  Eyes:      General: No scleral icterus  Right eye: No discharge           Left eye: No discharge  Conjunctiva/sclera: Conjunctivae normal       Pupils: Pupils are equal, round, and reactive to light  Neck:      Thyroid: No thyromegaly  Vascular: No JVD  Cardiovascular:      Rate and Rhythm: Normal rate and regular rhythm  Heart sounds: Normal heart sounds  No murmur heard  No friction rub  No gallop  Pulmonary:      Effort: Pulmonary effort is normal  No respiratory distress  Breath sounds: Normal breath sounds  No wheezing or rales  Chest:      Chest wall: No tenderness  Abdominal:      General: Bowel sounds are normal  There is no distension  Palpations: Abdomen is soft  There is no mass  Tenderness: There is no abdominal tenderness  There is no guarding or rebound  Musculoskeletal:         General: No tenderness or deformity  Normal range of motion  Cervical back: Normal range of motion and neck supple  Lymphadenopathy:      Cervical: No cervical adenopathy  Skin:     General: Skin is warm and dry  Coloration: Skin is not pale  Findings: No erythema or rash  Neurological:      Mental Status: He is alert and oriented to person, place, and time  Cranial Nerves: No cranial nerve deficit  Coordination: Coordination normal       Deep Tendon Reflexes: Reflexes are normal and symmetric  Psychiatric:         Behavior: Behavior normal          Thought Content:  Thought content normal          Judgment: Judgment normal

## 2022-09-19 ENCOUNTER — OFFICE VISIT (OUTPATIENT)
Dept: INTERNAL MEDICINE CLINIC | Facility: OTHER | Age: 62
End: 2022-09-19
Payer: COMMERCIAL

## 2022-09-19 VITALS
TEMPERATURE: 99.2 F | HEART RATE: 77 BPM | HEIGHT: 69 IN | SYSTOLIC BLOOD PRESSURE: 122 MMHG | WEIGHT: 173.4 LBS | BODY MASS INDEX: 25.68 KG/M2 | OXYGEN SATURATION: 96 % | RESPIRATION RATE: 18 BRPM | DIASTOLIC BLOOD PRESSURE: 78 MMHG

## 2022-09-19 DIAGNOSIS — Z12.11 SCREENING FOR COLON CANCER: ICD-10-CM

## 2022-09-19 DIAGNOSIS — Z00.00 ANNUAL PHYSICAL EXAM: Primary | ICD-10-CM

## 2022-09-19 DIAGNOSIS — Z12.5 PROSTATE CANCER SCREENING: ICD-10-CM

## 2022-09-19 DIAGNOSIS — Z13.6 ENCOUNTER FOR LIPID SCREENING FOR CARDIOVASCULAR DISEASE: ICD-10-CM

## 2022-09-19 DIAGNOSIS — Z13.228 SCREENING FOR METABOLIC DISORDER: ICD-10-CM

## 2022-09-19 DIAGNOSIS — Z13.220 ENCOUNTER FOR LIPID SCREENING FOR CARDIOVASCULAR DISEASE: ICD-10-CM

## 2022-09-19 PROCEDURE — 3725F SCREEN DEPRESSION PERFORMED: CPT | Performed by: INTERNAL MEDICINE

## 2022-09-19 PROCEDURE — 99396 PREV VISIT EST AGE 40-64: CPT | Performed by: INTERNAL MEDICINE

## 2022-09-19 NOTE — LETTER
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

## 2022-09-19 NOTE — ASSESSMENT & PLAN NOTE
Discussed continuing diet and exercise  Will order a CBC, CMP, Lipid panel, and TSH for screening  Discussed colorectal cancer screening  PSA ordered for prostate cancer screening

## 2022-09-19 NOTE — PROGRESS NOTES
ADULT ANNUAL [id number] [facility] [place]    NAME: Ivonne Gudino  AGE: 63 y o  SEX: male  : 1960     DATE: 2022     Assessment and Plan:     Problem List Items Addressed This Visit        Other    Encounter for lipid screening for cardiovascular disease    Relevant Orders    Lipid panel    Annual physical exam - Primary     Discussed continuing diet and exercise  Will order a CBC, CMP, Lipid panel, and TSH for screening  Discussed colorectal cancer screening  PSA ordered for prostate cancer screening  Relevant Orders    CBC    Comprehensive metabolic panel    Lipid panel    PSA, Total Screen    TSH, 3rd generation with Free T4 reflex      Other Visit Diagnoses     Screening for colon cancer        Relevant Orders    Ambulatory Referral to Gastroenterology    Prostate cancer screening        Relevant Orders    PSA, Total Screen    Screening for metabolic disorder        Relevant Orders    CBC    Comprehensive metabolic panel    TSH, 3rd generation with Free T4 reflex          Immunizations and preventive care screenings were discussed with patient today  Appropriate education was printed on patient's after visit summary  Discussed risks and benefits of prostate cancer screening  We discussed the controversial history of PSA screening for prostate cancer in the [place] as well as the risk of over detection and over treatment of prostate cancer by way of PSA screening  The patient understands that PSA blood testing is an imperfect way to screen for prostate cancer and that elevated PSA levels in the blood may also be caused by infection, inflammation, prostatic trauma or manipulation, urological procedures, or by benign prostatic enlargement      The role of the digital rectal examination in prostate cancer screening was also discussed and I discussed with him that there is large interobserver variability in the findings of digital rectal examination  Counseling:  Alcohol/drug use: discussed moderation in alcohol intake, the recommendations for healthy alcohol use, and avoidance of illicit drug use  Dental Health: discussed importance of regular tooth brushing, flossing, and dental visits  Injury prevention: discussed safety/seat belts, safety helmets, smoke detectors, carbon dioxide detectors, and smoking near bedding or upholstery  Sexual health: discussed sexually transmitted diseases, partner selection, use of condoms, avoidance of unintended pregnancy, and contraceptive alternatives  Exercise: the importance of regular exercise/physical activity was discussed  Recommend exercise 3-5 times per week for at least 30 minutes  BMI Counseling: Body mass index is 25 61 kg/m²  The BMI is above normal  Nutrition recommendations include encouraging healthy choices of fruits and vegetables, decreasing fast food intake, consuming healthier snacks, limiting drinks that contain sugar, moderation in carbohydrate intake, increasing intake of lean protein, reducing intake of saturated and trans fat and reducing intake of cholesterol  Exercise recommendations include moderate physical activity 150 minutes/week and exercising 3-5 times per week  No pharmacotherapy was ordered  Patient referred to PCP  Depression Screening and Follow-up Plan: Patient was screened for depression during today's encounter  They screened negative with a PHQ-2 score of 0  Return in about 1 year (around 9/19/2023) for Annual physical      Chief Complaint:     Chief Complaint   Patient presents with    Physical Exam     Annual exam, last labs done 9/16/21        BMI Adult, BMI f/u plan, colonoscopy, PHQ    Insomnia     Cant sleep since childhood and he has tried everything  Strongly opposed to taking any sleeping pills   He would like a note stating that for jury summons      History of Present Illness:     Adult Annual Physical Patient here for a comprehensive physical exam  The patient reports no problems  Diet and Physical Activity  Diet/Nutrition: well balanced diet, consuming 3-5 servings of fruits/vegetables daily, adequate fiber intake and adequate whole grain intake  Exercise: 3-4 times a week on average  Depression Screening  PHQ-2/9 Depression Screening    Little interest or pleasure in doing things: 0 - not at all  Feeling down, depressed, or hopeless: 0 - not at all  PHQ-2 Score: 0  PHQ-2 Interpretation: Negative depression screen       General Health  Sleep: sleeps poorly, gets 7-8 hours of sleep on average and fragmented sleep  Hearing: normal - bilateral   Vision: no vision problems  Dental: regular dental visits, brushes teeth twice daily and flosses teeth occasionally   Health  Symptoms include: none     Review of Systems:     Review of Systems   Constitutional: Negative for activity change, appetite change, chills, diaphoresis, fatigue and fever  HENT: Negative for congestion, postnasal drip, rhinorrhea, sinus pressure, sinus pain, sneezing and sore throat  Eyes: Negative for visual disturbance  Respiratory: Negative for apnea, cough, choking, chest tightness, shortness of breath and wheezing  Cardiovascular: Negative for chest pain, palpitations and leg swelling  Gastrointestinal: Negative for abdominal distention, abdominal pain, anal bleeding, blood in stool, constipation, diarrhea, nausea and vomiting  Endocrine: Negative for cold intolerance and heat intolerance  Genitourinary: Negative for difficulty urinating, dysuria and hematuria  Musculoskeletal: Negative  Skin: Negative  Neurological: Negative for dizziness, weakness, light-headedness, numbness and headaches  Hematological: Negative for adenopathy  Psychiatric/Behavioral: Negative for agitation, sleep disturbance and suicidal ideas  All other systems reviewed and are negative       Past Medical History: Past Medical History:   Diagnosis Date    Allergic     mild food sensativities    Allergy     Circadian rhythm sleep disorder, shift work type 4/3/2017    Cold sore 5/1/2018    Herpes     Shingles       Past Surgical History:     Past Surgical History:   Procedure Laterality Date    [place] ESOPHAGOGASTRODUODENOSCOPY TRANSORAL DIAGNOSTIC N/A 12/5/2017    Procedure: EGD AND COLONOSCOPY;  Surgeon: Yolis Underwood MD;  [facility];   Service: Gastroenterology    TONSILLECTOMY      w/adenoidectomy      Family History:     Family History   Problem Relation Age of Onset    Colon polyps Father     Diabetes type II Sister       Social History:     Social History     Socioeconomic History    Marital status: /Civil Union     Spouse name: None    Number of children: None    Years of education: None    Highest education level: None   Occupational History    Occupation: Air traffic control   Tobacco Use    Smoking status: Former Smoker    Smokeless tobacco: Never Used    Tobacco comment: 21 yrs ago   Vaping Use    Vaping Use: Never used   Substance and Sexual Activity    Alcohol use: Yes     Comment: once a week    Drug use: No    Sexual activity: None   Other Topics Concern    None   Social History Narrative    Exercising regularly Primary form of exercise is cross training and weight lifting     Social Determinants of Health     Financial Resource Strain: Not on file   Food Insecurity: Not on file   Transportation Needs: Not on file   Physical Activity: Not on file   Stress: Not on file   Social Connections: Not on file   Intimate Partner Violence: Not on file   Housing Stability: Not on file      Current Medications:     Current Outpatient Medications   Medication Sig Dispense Refill    B Complex-C (B-COMPLEX WITH VITAMIN C) tablet Take 1 tablet by mouth daily      CREATINE PO Take 5 g by mouth in the morning      fluticasone (FLONASE) 50 mcg/act nasal spray 2 sprays into each nostril as needed       multivitamin (THERAGRAN) TABS Take 1 tablet by mouth      NON FORMULARY 3 g 2 (two) times a day Gamma ammino butryc acid       No current facility-administered medications for this visit  Allergies:     No Known Allergies   Physical Exam:     /78 (BP Location: Left arm, Patient Position: Sitting, Cuff Size: Standard)   Pulse 77   Temp 99 2 °F (37 3 °C) (Temporal)   Resp 18   Ht 5' 9" (1 753 m)   Wt 78 7 kg (173 lb 6 4 oz)   SpO2 96%   BMI 25 61 kg/m²     Physical Exam  Vitals and nursing note reviewed  Constitutional:       General: He is not in acute distress  Appearance: Normal appearance  He is normal weight  He is not ill-appearing, toxic-appearing or diaphoretic  HENT:      Head: Normocephalic and atraumatic  Right Ear: Tympanic membrane, ear canal and external ear normal  There is no impacted cerumen  Left Ear: Tympanic membrane, ear canal and external ear normal  There is no impacted cerumen  Nose: Nose normal  No congestion or rhinorrhea  Mouth/Throat:      Mouth: Mucous membranes are moist       Pharynx: Oropharynx is clear  No oropharyngeal exudate or posterior oropharyngeal erythema  Eyes:      General: No scleral icterus  Right eye: No discharge  Left eye: No discharge  Extraocular Movements: Extraocular movements intact  Conjunctiva/sclera: Conjunctivae normal       Pupils: Pupils are equal, round, and reactive to light  Neck:      Vascular: No carotid bruit  Cardiovascular:      Rate and Rhythm: Normal rate and regular rhythm  Pulses: Normal pulses  Heart sounds: Normal heart sounds  No murmur heard  No friction rub  No gallop  Pulmonary:      Effort: Pulmonary effort is normal  No respiratory distress  Breath sounds: Normal breath sounds  No wheezing or rales  Abdominal:      General: Abdomen is flat  Bowel sounds are normal  There is no distension  Palpations: Abdomen is soft  There is no mass  Tenderness: There is no abdominal tenderness  There is no guarding  Hernia: No hernia is present  Musculoskeletal:         General: No swelling, tenderness, deformity or signs of injury  Normal range of motion  Cervical back: Normal range of motion and neck supple  No rigidity  No muscular tenderness  Right lower leg: No edema  Left lower leg: No edema  Lymphadenopathy:      Cervical: No cervical adenopathy  Skin:     General: Skin is warm and dry  Capillary Refill: Capillary refill takes less than 2 seconds  Coloration: Skin is not jaundiced or pale  Findings: No bruising, erythema, lesion or rash  Neurological:      General: No focal deficit present  Mental Status: He is alert and oriented to person, place, and time  Mental status is at baseline  Cranial Nerves: No cranial nerve deficit  Sensory: No sensory deficit  Motor: No weakness  Coordination: Coordination normal       Gait: Gait normal       Deep Tendon Reflexes: Reflexes normal    Psychiatric:         Mood and Affect: Mood normal          Behavior: Behavior normal          Thought Content:  Thought content normal          Judgment: Judgment normal           Jasbir Bonner MD  [address] [facility]

## 2022-10-12 PROBLEM — Z13.6 ENCOUNTER FOR LIPID SCREENING FOR CARDIOVASCULAR DISEASE: Status: RESOLVED | Noted: 2021-09-14 | Resolved: 2022-10-12

## 2022-10-12 PROBLEM — Z13.220 ENCOUNTER FOR LIPID SCREENING FOR CARDIOVASCULAR DISEASE: Status: RESOLVED | Noted: 2021-09-14 | Resolved: 2022-10-12

## 2023-02-22 ENCOUNTER — OFFICE VISIT (OUTPATIENT)
Dept: INTERNAL MEDICINE CLINIC | Facility: OTHER | Age: 63
End: 2023-02-22

## 2023-02-22 VITALS
DIASTOLIC BLOOD PRESSURE: 62 MMHG | SYSTOLIC BLOOD PRESSURE: 116 MMHG | TEMPERATURE: 97.3 F | WEIGHT: 174 LBS | BODY MASS INDEX: 25.77 KG/M2 | OXYGEN SATURATION: 98 % | HEIGHT: 69 IN | HEART RATE: 65 BPM

## 2023-02-22 DIAGNOSIS — R82.998 FOAMY URINE: Primary | ICD-10-CM

## 2023-02-22 DIAGNOSIS — Z13.6 ENCOUNTER FOR LIPID SCREENING FOR CARDIOVASCULAR DISEASE: ICD-10-CM

## 2023-02-22 DIAGNOSIS — Z13.220 ENCOUNTER FOR LIPID SCREENING FOR CARDIOVASCULAR DISEASE: ICD-10-CM

## 2023-02-22 DIAGNOSIS — Z12.5 PROSTATE CANCER SCREENING: ICD-10-CM

## 2023-02-22 DIAGNOSIS — K21.9 GERD WITHOUT ESOPHAGITIS: ICD-10-CM

## 2023-02-22 LAB
SL AMB  POCT GLUCOSE, UA: NORMAL
SL AMB LEUKOCYTE ESTERASE,UA: NORMAL
SL AMB POCT BILIRUBIN,UA: NORMAL
SL AMB POCT BLOOD,UA: NORMAL
SL AMB POCT CLARITY,UA: CLEAR
SL AMB POCT COLOR,UA: YELLOW
SL AMB POCT KETONES,UA: NORMAL
SL AMB POCT NITRITE,UA: NORMAL
SL AMB POCT PH,UA: 6.5
SL AMB POCT SPECIFIC GRAVITY,UA: 1.01
SL AMB POCT URINE PROTEIN: NORMAL
SL AMB POCT UROBILINOGEN: NORMAL

## 2023-02-22 NOTE — ASSESSMENT & PLAN NOTE
POCT urine dip stick completed in office today and WNL     - educated patient on warning signs of UTI  -last set of labs from 9/2/22 were all WNL-

## 2023-02-22 NOTE — PROGRESS NOTES
Assessment/Plan:    Foamy urine  POCT urine dip stick completed in office today and WNL  - educated patient on warning signs of UTI  -last set of labs from 9/2/22 were all WNL-      GERD without esophagitis  Patient has no issues with reflux since he started the intermittent fasting   Currently stable- patient to continue healthy diet         Depression Screening and Follow-up Plan: Patient was screened for depression during today's encounter  They screened negative with a PHQ-2 score of 0  Diagnoses and all orders for this visit:    Foamy urine  -     POCT urine dip  -     CBC; Future  -     Comprehensive metabolic panel; Future    Encounter for lipid screening for cardiovascular disease  -     Lipid panel; Future    Prostate cancer screening  -     PSA, Total Screen; Future    GERD without esophagitis  -     CBC; Future  -     Comprehensive metabolic panel; Future         Subjective:      Patient ID: Ivonne Guidno is a 63 y o  male  62 yo male presents to the office today for concerns of his urine looking "foamy " He expresses that he just wanted to ensure his kidneys were working properly  Patient reports he has been working out a lot and does an 18 hr intermittent fasting daily (consumes water during the fasting but no food)  He reports he does feel like he has to drink an excessive amount of water in the morning to "get his system going" and was here to make sure he didn't have UTI  Patient denies any burning, decreased urine production, urgency, frequency, flank pain, or blood in urine  POCT urine dip stick was done and WNL  Patient to continue health lifestyle, diet and exercise                The following portions of the patient's history were reviewed and updated as appropriate: allergies, current medications, past family history, past medical history, past social history, past surgical history and problem list     Review of Systems   Constitutional: Negative for appetite change, chills and fever  HENT: Positive for postnasal drip ( uses flonase )  Negative for congestion and rhinorrhea  Respiratory: Negative for cough and shortness of breath  Cardiovascular: Negative for chest pain and palpitations  Genitourinary: Negative for decreased urine volume, difficulty urinating, dysuria, flank pain, frequency, genital sores, hematuria, penile discharge, penile pain, scrotal swelling, testicular pain and urgency  Past Medical History:   Diagnosis Date   • Allergic     mild food sensativities   • Allergy    • Circadian rhythm sleep disorder, shift work type 4/3/2017   • Cold sore 5/1/2018   • Herpes    • Shingles          Current Outpatient Medications:   •  B Complex-C (B-COMPLEX WITH VITAMIN C) tablet, Take 1 tablet by mouth daily, Disp: , Rfl:   •  CREATINE PO, Take 5 g by mouth in the morning, Disp: , Rfl:   •  fluticasone (FLONASE) 50 mcg/act nasal spray, 2 sprays into each nostril as needed , Disp: , Rfl:   •  multivitamin (THERAGRAN) TABS, Take 1 tablet by mouth, Disp: , Rfl:   •  NON FORMULARY, 3 g 2 (two) times a day Gamma ammino butryc acid, Disp: , Rfl:     No Known Allergies    Social History   Past Surgical History:   Procedure Laterality Date   • [place] ESOPHAGOGASTRODUODENOSCOPY TRANSORAL DIAGNOSTIC N/A 12/5/2017    Procedure: EGD AND COLONOSCOPY;  Surgeon: Gabe Perez MD;  [facility];   Service: Gastroenterology   • TONSILLECTOMY      w/adenoidectomy     Family History   Problem Relation Age of Onset   • Colon polyps Father    • Diabetes type II Sister        Objective:  /62 (BP Location: Left arm, Patient Position: Sitting, Cuff Size: Adult)   Pulse 65   Temp (!) 97 3 °F (36 3 °C) (Temporal)   Ht 5' 9" (1 753 m)   Wt 78 9 kg (174 lb)   SpO2 98%   BMI 25 70 kg/m²     Recent Results (from the past 1344 hour(s))   POCT urine dip    Collection Time: 02/22/23  3:02 PM   Result Value Ref Range    LEUKOCYTE ESTERASE,UA neg     NITRITE,UA neg     SL AMB POCT UROBILINOGEN 0 2 e  u /dl     POCT URINE PROTEIN neg      PH,UA 6 5     BLOOD,UA neg     SPECIFIC GRAVITY,UA 1 015     KETONES,UA neg     BILIRUBIN,UA neg     GLUCOSE, UA neg      COLOR,UA yellow     CLARITY,UA clear             Physical Exam  Vitals and nursing note reviewed  Constitutional:       Appearance: Normal appearance  HENT:      Head: Normocephalic  Nose: Nose normal       Mouth/Throat:      Mouth: Mucous membranes are moist    Eyes:      Extraocular Movements: Extraocular movements intact  Conjunctiva/sclera: Conjunctivae normal       Pupils: Pupils are equal, round, and reactive to light  Cardiovascular:      Rate and Rhythm: Normal rate and regular rhythm  Pulses: Normal pulses  Heart sounds: Normal heart sounds  Pulmonary:      Effort: Pulmonary effort is normal       Breath sounds: Normal breath sounds  Abdominal:      General: Bowel sounds are normal       Tenderness: There is no abdominal tenderness  There is no right CVA tenderness or left CVA tenderness  Skin:     General: Skin is warm and dry  Capillary Refill: Capillary refill takes less than 2 seconds  Neurological:      Mental Status: He is alert and oriented to person, place, and time  Psychiatric:         Mood and Affect: Mood normal          Behavior: Behavior normal          Thought Content:  Thought content normal          Judgment: Judgment normal

## 2023-02-22 NOTE — ASSESSMENT & PLAN NOTE
Patient has no issues with reflux since he started the intermittent fasting     Currently stable- patient to continue healthy diet

## 2023-11-02 ENCOUNTER — OFFICE VISIT (OUTPATIENT)
Dept: INTERNAL MEDICINE CLINIC | Facility: OTHER | Age: 63
End: 2023-11-02
Payer: COMMERCIAL

## 2023-11-02 VITALS
WEIGHT: 169 LBS | SYSTOLIC BLOOD PRESSURE: 110 MMHG | DIASTOLIC BLOOD PRESSURE: 70 MMHG | BODY MASS INDEX: 25.03 KG/M2 | TEMPERATURE: 98.2 F | HEIGHT: 69 IN | OXYGEN SATURATION: 98 % | HEART RATE: 60 BPM

## 2023-11-02 DIAGNOSIS — Z12.12 ENCOUNTER FOR COLORECTAL CANCER SCREENING: ICD-10-CM

## 2023-11-02 DIAGNOSIS — Z00.00 ANNUAL PHYSICAL EXAM: Primary | ICD-10-CM

## 2023-11-02 DIAGNOSIS — R53.83 DECREASED ENERGY: ICD-10-CM

## 2023-11-02 DIAGNOSIS — F51.01 PRIMARY INSOMNIA: ICD-10-CM

## 2023-11-02 DIAGNOSIS — Z12.11 ENCOUNTER FOR COLORECTAL CANCER SCREENING: ICD-10-CM

## 2023-11-02 DIAGNOSIS — R82.998 FOAMY URINE: ICD-10-CM

## 2023-11-02 PROCEDURE — 99396 PREV VISIT EST AGE 40-64: CPT | Performed by: INTERNAL MEDICINE

## 2023-11-02 NOTE — PROGRESS NOTES
ADULT ANNUAL  [facility] [place]    NAME: Vonnie Riggs  AGE: 63 y.o. SEX: male  : 1960     DATE: 2023     Assessment and Plan:     Problem List Items Addressed This Visit        Other    Annual physical exam - Primary     Discussed diet and exercise. He would like to defer on influenza immunization at this time. Discussed skin cancer screening. He is up to date on PSA screening. Foamy urine    Relevant Orders    Testosterone, free, total    UA w Reflex to Microscopic w Reflex to Culture -Lab Collect    Primary insomnia    Relevant Orders    Testosterone, free, total   Other Visit Diagnoses     Decreased energy        Relevant Orders    Testosterone, free, total    UA w Reflex to Microscopic w Reflex to Culture -Lab Collect    Encounter for colorectal cancer screening        Relevant Orders    Cologuard          Immunizations and preventive care screenings were discussed with patient today. Appropriate education was printed on patient's after visit summary. Discussed risks and benefits of prostate cancer screening. We discussed the controversial history of PSA screening for prostate cancer in the [facility] as well as the risk of over detection and over treatment of prostate cancer by way of PSA screening. The patient understands that PSA blood testing is an imperfect way to screen for prostate cancer and that elevated PSA levels in the blood may also be caused by infection, inflammation, prostatic trauma or manipulation, urological procedures, or by benign prostatic enlargement. The role of the digital rectal examination in prostate cancer screening was also discussed and I discussed with him that there is large interobserver variability in the findings of digital rectal examination.     Counseling:  Alcohol/drug use: discussed moderation in alcohol intake, the recommendations for healthy alcohol use, and avoidance of illicit drug use. Dental Health: discussed importance of regular tooth brushing, flossing, and dental visits. Injury prevention: discussed safety/seat belts, safety helmets, smoke detectors, carbon dioxide detectors, and smoking near bedding or upholstery. Sexual health: discussed sexually transmitted diseases, partner selection, use of condoms, avoidance of unintended pregnancy, and contraceptive alternatives. Exercise: the importance of regular exercise/physical activity was discussed. Recommend exercise 3-5 times per week for at least 30 minutes. Depression Screening and Follow-up Plan: Patient was screened for depression during today's encounter. They screened negative with a PHQ-2 score of 0. Return in about 1 year (around 11/2/2024) for Annual physical.     Chief Complaint:     Chief Complaint   Patient presents with   • Follow-up     LABS 10/10   •      Depression screening, depression screening    • Physical Exam      History of Present Illness:     Adult Annual Physical   Patient here for a comprehensive physical exam. The patient reports no problems. Diet and Physical Activity  Diet/Nutrition: well balanced diet. Exercise: 5-7 times a week on average. Depression Screening  PHQ-2/9 Depression Screening    Little interest or pleasure in doing things: 0 - not at all  Feeling down, depressed, or hopeless: 0 - not at all  PHQ-2 Score: 0  PHQ-2 Interpretation: Negative depression screen       General Health  Sleep: sleeps poorly and gets 4-6 hours of sleep on average. Hearing: normal - bilateral.  Vision: no vision problems. Dental: regular dental visits, brushes teeth twice daily, and flosses teeth occasionally.  Health  Symptoms include: none     Review of Systems:     Review of Systems   Constitutional:  Negative for activity change, appetite change, chills, diaphoresis, fatigue and fever.    HENT:  Negative for congestion, postnasal drip, rhinorrhea, sinus pressure, sinus pain, sneezing and sore throat. Eyes:  Negative for visual disturbance. Respiratory:  Negative for apnea, cough, choking, chest tightness, shortness of breath and wheezing. Cardiovascular:  Negative for chest pain, palpitations and leg swelling. Gastrointestinal:  Negative for abdominal distention, abdominal pain, anal bleeding, blood in stool, constipation, diarrhea, nausea and vomiting. Endocrine: Negative for cold intolerance and heat intolerance. Genitourinary:  Negative for difficulty urinating, dysuria and hematuria. Musculoskeletal: Negative. Skin: Negative. Neurological:  Negative for dizziness, weakness, light-headedness, numbness and headaches. Hematological:  Negative for adenopathy. Psychiatric/Behavioral:  Negative for agitation, sleep disturbance and suicidal ideas. All other systems reviewed and are negative. 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   • Cold sore 5/1/2018   • Herpes    • Shingles       Past Surgical History:     Past Surgical History:   Procedure Laterality Date   • [place] ESOPHAGOGASTRODUODENOSCOPY TRANSORAL DIAGNOSTIC N/A 12/5/2017    Procedure: EGD AND COLONOSCOPY;  Surgeon: Tam Hood MD;  [facility];   Service: Gastroenterology   • TONSILLECTOMY      w/adenoidectomy      Family History:     Family History   Problem Relation Age of Onset   • Colon polyps Father    • Diabetes type II Sister       Social History:     Social History     Socioeconomic History   • Marital status: /Civil Union     Spouse name: None   • Number of children: None   • Years of education: None   • Highest education level: None   Occupational History   • Occupation: Air traffic control   Tobacco Use   • Smoking status: Former     Packs/day: 1.00     Years: 21.00     Total pack years: 21.00 Types: Cigarettes     Start date: 5     Quit date:      Years since quittin.8   • Smokeless tobacco: Never   • Tobacco comments:     21 yrs ago   Vaping Use   • Vaping Use: Never used   Substance and Sexual Activity   • Alcohol use: Yes     Comment: once a week   • Drug use: No   • Sexual activity: None   Other Topics Concern   • None   Social History Narrative    Exercising regularly Primary form of exercise is cross training and weight lifting     Social Determinants of Health     Financial Resource Strain: Not on file   Food Insecurity: Not on file   Transportation Needs: Not on file   Physical Activity: Not on file   Stress: Not on file   Social Connections: Not on file   Intimate Partner Violence: Not on file   Housing Stability: Not on file      Current Medications:     Current Outpatient Medications   Medication Sig Dispense Refill   • B Complex-C (B-COMPLEX WITH VITAMIN C) tablet Take 1 tablet by mouth daily     • CREATINE PO Take 5 g by mouth in the morning     • fluticasone (FLONASE) 50 mcg/act nasal spray 2 sprays into each nostril as needed      • multivitamin (THERAGRAN) TABS Take 1 tablet by mouth     • NON FORMULARY 3 g 2 (two) times a day Gamma ammino butryc acid       No current facility-administered medications for this visit. Allergies:     No Known Allergies   Physical Exam:     /70 (BP Location: Left arm, Patient Position: Sitting, Cuff Size: Standard)   Pulse 60   Temp 98.2 °F (36.8 °C) (Temporal)   Ht 5' 9" (1.753 m)   Wt 76.7 kg (169 lb)   SpO2 98%   BMI 24.96 kg/m²     Physical Exam  Vitals and nursing note reviewed. Constitutional:       General: He is not in acute distress. Appearance: Normal appearance. He is normal weight. He is not ill-appearing, toxic-appearing or diaphoretic. HENT:      Head: Normocephalic and atraumatic. Right Ear: Tympanic membrane, ear canal and external ear normal. There is no impacted cerumen.       Left Ear: Tympanic membrane, ear canal and external ear normal. There is no impacted cerumen. Nose: Nose normal. No congestion or rhinorrhea. Mouth/Throat:      Mouth: Mucous membranes are moist.      Pharynx: Oropharynx is clear. No oropharyngeal exudate or posterior oropharyngeal erythema. Eyes:      General: No scleral icterus. Right eye: No discharge. Left eye: No discharge. Extraocular Movements: Extraocular movements intact. Conjunctiva/sclera: Conjunctivae normal.      Pupils: Pupils are equal, round, and reactive to light. Neck:      Vascular: No carotid bruit. Cardiovascular:      Rate and Rhythm: Normal rate and regular rhythm. Pulses: Normal pulses. Heart sounds: Normal heart sounds. No murmur heard. No friction rub. No gallop. Pulmonary:      Effort: Pulmonary effort is normal. No respiratory distress. Breath sounds: Normal breath sounds. No wheezing or rales. Abdominal:      General: Abdomen is flat. Bowel sounds are normal. There is no distension. Palpations: Abdomen is soft. There is no mass. Tenderness: There is no abdominal tenderness. There is no guarding. Hernia: No hernia is present. Musculoskeletal:         General: No swelling, tenderness, deformity or signs of injury. Normal range of motion. Cervical back: Normal range of motion and neck supple. No rigidity. No muscular tenderness. Right lower leg: No edema. Left lower leg: No edema. Lymphadenopathy:      Cervical: No cervical adenopathy. Skin:     General: Skin is warm and dry. Capillary Refill: Capillary refill takes less than 2 seconds. Coloration: Skin is not jaundiced or pale. Findings: No bruising, erythema, lesion or rash. Neurological:      General: No focal deficit present. Mental Status: He is alert and oriented to person, place, and time. Mental status is at baseline. Cranial Nerves: No cranial nerve deficit.       Sensory: No sensory deficit. Motor: No weakness. Coordination: Coordination normal.      Gait: Gait normal.      Deep Tendon Reflexes: Reflexes normal.   Psychiatric:         Mood and Affect: Mood normal.         Behavior: Behavior normal.         Thought Content:  Thought content normal.         Judgment: Judgment normal.          Alyson oLyd MD  [address] [facility] [address] [place] [address] [facility]IMARY [facility]ARE [place] [facility]

## 2023-11-02 NOTE — ASSESSMENT & PLAN NOTE
Discussed diet and exercise. He would like to defer on influenza immunization at this time. Discussed skin cancer screening. He is up to date on PSA screening.

## 2023-11-18 LAB — COLOGUARD RESULT REPORTABLE: NEGATIVE

## 2024-07-18 ENCOUNTER — TELEPHONE (OUTPATIENT)
Age: 64
End: 2024-07-18

## 2024-09-03 ENCOUNTER — OFFICE VISIT (OUTPATIENT)
Dept: INTERNAL MEDICINE CLINIC | Facility: OTHER | Age: 64
End: 2024-09-03
Payer: COMMERCIAL

## 2024-09-03 VITALS
OXYGEN SATURATION: 98 % | SYSTOLIC BLOOD PRESSURE: 126 MMHG | BODY MASS INDEX: 25.77 KG/M2 | DIASTOLIC BLOOD PRESSURE: 70 MMHG | HEIGHT: 69 IN | TEMPERATURE: 98.6 F | HEART RATE: 67 BPM | WEIGHT: 174 LBS

## 2024-09-03 DIAGNOSIS — R82.998 FOAMY URINE: ICD-10-CM

## 2024-09-03 DIAGNOSIS — H92.01 RIGHT EAR PAIN: Primary | ICD-10-CM

## 2024-09-03 DIAGNOSIS — Z13.220 SCREENING FOR LIPID DISORDERS: ICD-10-CM

## 2024-09-03 DIAGNOSIS — Z12.5 SCREENING FOR PROSTATE CANCER: ICD-10-CM

## 2024-09-03 PROBLEM — K21.9 GERD WITHOUT ESOPHAGITIS: Status: RESOLVED | Noted: 2017-11-03 | Resolved: 2024-09-03

## 2024-09-03 LAB
SL AMB  POCT GLUCOSE, UA: NORMAL
SL AMB LEUKOCYTE ESTERASE,UA: NORMAL
SL AMB POCT BILIRUBIN,UA: NORMAL
SL AMB POCT BLOOD,UA: NORMAL
SL AMB POCT CLARITY,UA: CLEAR
SL AMB POCT COLOR,UA: YELLOW
SL AMB POCT KETONES,UA: NORMAL
SL AMB POCT NITRITE,UA: NORMAL
SL AMB POCT PH,UA: 5.5
SL AMB POCT SPECIFIC GRAVITY,UA: 1.01
SL AMB POCT URINE PROTEIN: NORMAL
SL AMB POCT UROBILINOGEN: 0.2

## 2024-09-03 PROCEDURE — 99213 OFFICE O/P EST LOW 20 MIN: CPT

## 2024-09-03 PROCEDURE — 3725F SCREEN DEPRESSION PERFORMED: CPT

## 2024-09-03 PROCEDURE — 81003 URINALYSIS AUTO W/O SCOPE: CPT

## 2024-09-03 NOTE — PROGRESS NOTES
"Ambulatory Visit  Name: Robb Casiano      : 1960      MRN: [id number]  Encounter Provider: Sheree Manzo PA-C  Encounter Date: 9/3/2024   Encounter department: [facility] [place]    Assessment & Plan   1. Right ear pain  Assessment & Plan:  Middle ear effusion without evidence of infection  Advised to increase Flonase to twice daily x 1 week, then resume once daily.  Educated on proper Flonase use  Discussed antihistamine, however patient states that he does not tolerate antihistamines  Advised patient to follow-up in office if symptoms worsen or persist  2. Foamy urine  Assessment & Plan:  Present for > 1 year  POCT urine dip in office today WNL  Last labs from  showed kidney function WNL  Will order repeat CMP and UA  If symptoms worsen or persist, may consider ultrasound or urology evaluation  Orders:  -     POCT urine dip auto non-scope  -     Comprehensive metabolic panel; Future  -     CBC and differential; Future  3. Screening for prostate cancer  -     PSA, Total Screen; Future  4. Screening for lipid disorders  -     Lipid Panel with Direct LDL reflex; Future      Depression Screening and Follow-up Plan: Patient was screened for depression during today's encounter. They screened negative with a PHQ-2 score of 0.      History of Present Illness     Patient is a 63-year-old male presenting to the office for right ear pain x years on and off. Current exacerbation for 1-2 weeks  Mild pain, notes \"cracking\" in R ear  Notes he had a scuba incident many years ago and since that time has occasional \"cracking \"of years.  Also notes history of tympanostomy tubes as a child  Notes feeling as though ear opens when he exercises  Taking flonase once per day    Foamy urine-   Symptoms present for over 1 year, denies any other urinary symptoms including frequency, urgency, blood, abdominal pain  POCT urine dip unremakrable   UA and testosterone labs in chart were not " "completed      Earache   There is pain in the right ear. This is a new problem. The current episode started 1 to 4 weeks ago. The problem occurs constantly. There has been no fever. The pain is mild. Pertinent negatives include no abdominal pain, coughing, diarrhea, headaches, hearing loss, rhinorrhea, sore throat or vomiting. His past medical history is significant for a tympanostomy tube.             Review of Systems   Constitutional:  Negative for chills, fatigue and fever.   HENT:  Positive for ear pain. Negative for congestion, hearing loss, postnasal drip, rhinorrhea, sinus pressure, sinus pain, sore throat and trouble swallowing.    Respiratory:  Negative for cough, chest tightness, shortness of breath and wheezing.    Cardiovascular:  Negative for chest pain and palpitations.   Gastrointestinal:  Negative for abdominal pain, constipation, diarrhea, nausea and vomiting.   Genitourinary:  Negative for difficulty urinating, dysuria, frequency, hematuria and urgency.        Foamy urine     Neurological:  Negative for dizziness, light-headedness and headaches.     Medical History Reviewed by provider this encounter:  Tobacco  Allergies  Meds  Problems  Med Hx  Surg Hx  Fam Hx       Objective     /70 (BP Location: Left arm, Patient Position: Sitting, Cuff Size: Adult)   Pulse 67   Temp 98.6 °F (37 °C) (Temporal)   Ht 5' 9\" (1.753 m)   Wt 78.9 kg (174 lb)   SpO2 98% Comment: ra  BMI 25.70 kg/m²     Physical Exam  Vitals and nursing note reviewed.   Constitutional:       General: He is not in acute distress.     Appearance: Normal appearance. He is not ill-appearing.   HENT:      Head: Normocephalic and atraumatic.      Right Ear: Ear canal and external ear normal. No tenderness. A middle ear effusion is present. Tympanic membrane is not scarred or erythematous.      Left Ear: Ear canal and external ear normal. No tenderness.  No middle ear effusion. Tympanic membrane is scarred. Tympanic " membrane is not erythematous.      Nose: Nose normal.      Mouth/Throat:      Mouth: Mucous membranes are moist.      Pharynx: Oropharynx is clear.   Eyes:      General: No scleral icterus.     Conjunctiva/sclera: Conjunctivae normal.   Cardiovascular:      Rate and Rhythm: Normal rate and regular rhythm.      Heart sounds: Normal heart sounds. No murmur heard.     No friction rub. No gallop.   Pulmonary:      Effort: Pulmonary effort is normal. No respiratory distress.      Breath sounds: Normal breath sounds. No wheezing, rhonchi or rales.   Chest:      Chest wall: No tenderness.   Musculoskeletal:      Right lower leg: No edema.      Left lower leg: No edema.   Skin:     General: Skin is warm and dry.      Coloration: Skin is not pale.      Findings: No erythema.   Neurological:      General: No focal deficit present.      Mental Status: He is alert and oriented to person, place, and time. Mental status is at baseline.   Psychiatric:         Mood and Affect: Mood normal.         Behavior: Behavior normal.       Administrative Statements   Disclaimer: This note was generated with voice recognition software.  Phonetic, grammatical, and spelling errors may be present as a result.  Please contact provider with any concerns or questions

## 2024-09-03 NOTE — ASSESSMENT & PLAN NOTE
Middle ear effusion without evidence of infection  Advised to increase Flonase to twice daily x 1 week, then resume once daily.  Educated on proper Flonase use  Discussed antihistamine, however patient states that he does not tolerate antihistamines  Advised patient to follow-up in office if symptoms worsen or persist

## 2024-09-03 NOTE — ASSESSMENT & PLAN NOTE
Present for > 1 year  POCT urine dip in office today WNL  Last labs from 2023 showed kidney function WNL  Will order repeat CMP and UA  If symptoms worsen or persist, may consider ultrasound or urology evaluation

## 2024-11-08 LAB
ALBUMIN SERPL-MCNC: 4.3 G/DL (ref 3.5–5.7)
ALP SERPL-CCNC: 43 U/L (ref 35–120)
ALT SERPL-CCNC: 21 U/L
ANION GAP SERPL CALCULATED.3IONS-SCNC: 11 MMOL/L (ref 3–11)
AST SERPL-CCNC: 22 U/L
BASOPHILS # BLD AUTO: 0.1 THOU/CMM (ref 0–0.1)
BASOPHILS NFR BLD AUTO: 2 %
BILIRUB SERPL-MCNC: 0.7 MG/DL (ref 0.2–1)
BUN SERPL-MCNC: 16 MG/DL (ref 7–28)
CALCIUM SERPL-MCNC: 9.1 MG/DL (ref 8.5–10.5)
CHLORIDE SERPL-SCNC: 100 MMOL/L (ref 100–109)
CHOLEST SERPL-MCNC: 207 MG/DL
CHOLEST/HDLC SERPL: 2.4 {RATIO}
CO2 SERPL-SCNC: 29 MMOL/L (ref 21–31)
CREAT SERPL-MCNC: 0.95 MG/DL (ref 0.53–1.3)
CYTOLOGY CMNT CVX/VAG CYTO-IMP: NORMAL
DIFFERENTIAL METHOD BLD: ABNORMAL
EOSINOPHIL # BLD AUTO: 0.1 THOU/CMM (ref 0–0.5)
EOSINOPHIL NFR BLD AUTO: 4 %
ERYTHROCYTE [DISTWIDTH] IN BLOOD BY AUTOMATED COUNT: 13.4 % (ref 12–16)
GFR/BSA.PRED SERPLBLD CYS-BASED-ARV: 91 ML/MIN/{1.73_M2}
GLUCOSE SERPL-MCNC: 77 MG/DL (ref 65–99)
HCT VFR BLD AUTO: 43.5 % (ref 37–48)
HDLC SERPL-MCNC: 85 MG/DL (ref 23–92)
HGB BLD-MCNC: 14.8 G/DL (ref 12.5–17)
LDLC SERPL CALC-MCNC: 112 MG/DL
LYMPHOCYTES # BLD AUTO: 1.5 THOU/CMM (ref 1–3)
LYMPHOCYTES NFR BLD AUTO: 42 %
MCH RBC QN AUTO: 33.2 PG (ref 27–36)
MCHC RBC AUTO-ENTMCNC: 34.1 G/DL (ref 32–37)
MCV RBC AUTO: 97 FL (ref 80–100)
MONOCYTES # BLD AUTO: 0.5 THOU/CMM (ref 0.3–1)
MONOCYTES NFR BLD AUTO: 14 %
NEUTROPHILS # BLD AUTO: 1.3 THOU/CMM (ref 1.8–7.8)
NEUTROPHILS NFR BLD AUTO: 38 %
NONHDLC SERPL-MCNC: 122 MG/DL
PLATELET # BLD AUTO: 188 THOU/CMM (ref 140–350)
PMV BLD REES-ECKER: 8.2 FL (ref 7.5–11.3)
POTASSIUM SERPL-SCNC: 4.4 MMOL/L (ref 3.5–5.2)
PROT SERPL-MCNC: 6.7 G/DL (ref 6.3–8.3)
PSA SERPL-MCNC: 0.63 NG/ML
RBC # BLD AUTO: 4.47 MILL/CMM (ref 4–5.4)
SODIUM SERPL-SCNC: 140 MMOL/L (ref 135–145)
TRIGL SERPL-MCNC: 51 MG/DL
WBC # BLD AUTO: 3.5 THOU/CMM (ref 4–10.5)

## 2024-11-15 ENCOUNTER — OFFICE VISIT (OUTPATIENT)
Dept: INTERNAL MEDICINE CLINIC | Facility: OTHER | Age: 64
End: 2024-11-15
Payer: COMMERCIAL

## 2024-11-15 ENCOUNTER — TELEPHONE (OUTPATIENT)
Dept: ADMINISTRATIVE | Facility: OTHER | Age: 64
End: 2024-11-15

## 2024-11-15 VITALS
HEIGHT: 69 IN | DIASTOLIC BLOOD PRESSURE: 68 MMHG | RESPIRATION RATE: 18 BRPM | SYSTOLIC BLOOD PRESSURE: 110 MMHG | BODY MASS INDEX: 25.33 KG/M2 | HEART RATE: 65 BPM | TEMPERATURE: 98.9 F | WEIGHT: 171 LBS | OXYGEN SATURATION: 98 %

## 2024-11-15 DIAGNOSIS — Z00.00 ANNUAL PHYSICAL EXAM: Primary | ICD-10-CM

## 2024-11-15 DIAGNOSIS — D72.819 LEUKOPENIA, UNSPECIFIED TYPE: ICD-10-CM

## 2024-11-15 DIAGNOSIS — Z13.220 ENCOUNTER FOR LIPID SCREENING FOR CARDIOVASCULAR DISEASE: ICD-10-CM

## 2024-11-15 DIAGNOSIS — Z12.5 PROSTATE CANCER SCREENING: ICD-10-CM

## 2024-11-15 DIAGNOSIS — Z12.2 SCREENING FOR LUNG CANCER: ICD-10-CM

## 2024-11-15 DIAGNOSIS — Z13.6 ENCOUNTER FOR LIPID SCREENING FOR CARDIOVASCULAR DISEASE: ICD-10-CM

## 2024-11-15 PROBLEM — H92.01 RIGHT EAR PAIN: Status: RESOLVED | Noted: 2024-09-03 | Resolved: 2024-11-15

## 2024-11-15 PROBLEM — Z86.19 H/O HERPES ZOSTER: Status: ACTIVE | Noted: 2024-11-15

## 2024-11-15 PROBLEM — R82.998 FOAMY URINE: Status: RESOLVED | Noted: 2023-02-22 | Resolved: 2024-11-15

## 2024-11-15 PROCEDURE — 99396 PREV VISIT EST AGE 40-64: CPT | Performed by: INTERNAL MEDICINE

## 2024-11-15 NOTE — TELEPHONE ENCOUNTER
----- Message from Roula ARIAS sent at 11/15/2024  7:23 AM EST -----  Regarding: COLOGUARD  11/15/24 7:23 AM    Hello, our patient Robb Casiano has had CRC: Gabnio completed/performed. Please assist in updating the patient chart by pulling the document from LAB Tab within Chart Review. The date of service is 11/10/23.     Thank you,  Roula Garcia MA  [facility] PRIMARY [facility]

## 2024-11-15 NOTE — TELEPHONE ENCOUNTER
Upon review of the In Basket request we were able to locate, review, and update the patient chart as requested for CRC: Cologuard.    Any additional questions or concerns should be emailed to the Practice Liaisons via the appropriate education email address, please do not reply via In Basket.    Thank you  Elodia Moore   [facility]

## 2024-11-15 NOTE — PROGRESS NOTES
Adult Annual Physical  Name: Robb Casiano      : 1960      MRN: [id number]  Encounter Provider: Harvey Rosas MD  Encounter Date: 11/15/2024   Encounter department: [facility] [place]    Assessment & Plan  Screening for lung cancer  I discussed with him that he is a candidate for lung cancer CT screening.     The following Shared Decision-Making points were covered:  Benefits of screening were discussed, including the rates of reduction in death from lung cancer and other causes.  Harms of screening were reviewed, including false positive tests, radiation exposure levels, risks of invasive procedures, risks of complications of screening, and risk of overdiagnosis.  I counseled on the importance of adherence to annual lung cancer LDCT screening, impact of co-morbidities, and ability or willingness to undergo diagnosis and treatment.  I counseled on the importance of maintaining abstinence as a former smoker or was counseled on the importance of smoking cessation if a current smoker    Review of Eligibility Criteria: He meets all of the criteria for Lung Cancer Screening.   He is 63 y.o.   He has 20 pack year tobacco history and is a current smoker or has quit within the past 15 years  He presents no signs or symptoms of lung cancer    After discussion, the patient decided to elect lung cancer screening.         Annual physical exam  Discussed continuing diet and exercise.   He is up to date on diabetes and cardiovascular screening.   Discussed skin cancer screening.          Immunizations and preventive care screenings were discussed with patient today. Appropriate education was printed on patient's after visit summary.    Discussed risks and benefits of prostate cancer screening. We discussed the controversial history of PSA screening for prostate cancer in the [place] as well as the risk of over detection and over treatment of prostate cancer by way of PSA  screening.  The patient understands that PSA blood testing is an imperfect way to screen for prostate cancer and that elevated PSA levels in the blood may also be caused by infection, inflammation, prostatic trauma or manipulation, urological procedures, or by benign prostatic enlargement.    The role of the digital rectal examination in prostate cancer screening was also discussed and I discussed with him that there is large interobserver variability in the findings of digital rectal examination.    Counseling:  Alcohol/drug use: discussed moderation in alcohol intake, the recommendations for healthy alcohol use, and avoidance of illicit drug use.  Dental Health: discussed importance of regular tooth brushing, flossing, and dental visits.  Injury prevention: discussed safety/seat belts, safety helmets, smoke detectors, carbon monoxide detectors, and smoking near bedding or upholstery.  Sexual health: discussed sexually transmitted diseases, partner selection, use of condoms, avoidance of unintended pregnancy, and contraceptive alternatives.  Exercise: the importance of regular exercise/physical activity was discussed. Recommend exercise 3-5 times per week for at least 30 minutes.          History of Present Illness     Adult Annual Physical:  Patient presents for annual physical.     Diet and Physical Activity:  - Diet/Nutrition: well balanced diet.  - Exercise: moderate cardiovascular exercise and 5-7 times a week on average.    General Health:  - Sleep: sleeps poorly and 7-8 hours of sleep on average. waking up multiple times a night.  - Hearing: normal hearing bilateral ears.  - Vision: no vision problems and goes for regular eye exams.  - Dental: regular dental visits and brushes teeth twice daily.     Health:  - History of STDs: no.   - Urinary symptoms: none.     Review of Systems   Constitutional:  Negative for activity change, appetite change, chills, diaphoresis, fatigue and fever.   HENT:  Negative for  congestion, postnasal drip, rhinorrhea, sinus pressure, sinus pain, sneezing and sore throat.    Eyes:  Negative for visual disturbance.   Respiratory:  Negative for apnea, cough, choking, chest tightness, shortness of breath and wheezing.    Cardiovascular:  Negative for chest pain, palpitations and leg swelling.   Gastrointestinal:  Negative for abdominal distention, abdominal pain, anal bleeding, blood in stool, constipation, diarrhea, nausea and vomiting.   Endocrine: Negative for cold intolerance and heat intolerance.   Genitourinary:  Negative for difficulty urinating, dysuria and hematuria.   Musculoskeletal: Negative.    Skin: Negative.    Neurological:  Negative for dizziness, weakness, light-headedness, numbness and headaches.   Hematological:  Negative for adenopathy.   Psychiatric/Behavioral:  Negative for agitation, sleep disturbance and suicidal ideas.    All other systems reviewed and are negative.    Medical History Reviewed by provider this encounter:  Tobacco  Allergies  Meds  Problems  Med Hx  Surg Hx  Fam Hx     .  Current Outpatient Medications on File Prior to Visit   Medication Sig Dispense Refill    B Complex-C (B-COMPLEX WITH VITAMIN C) tablet Take 1 tablet by mouth daily      CREATINE PO Take 5 g by mouth in the morning      fluticasone (FLONASE) 50 mcg/act nasal spray 2 sprays into each nostril as needed       multivitamin (THERAGRAN) TABS Take 1 tablet by mouth      NON FORMULARY 3 g 2 (two) times a day Gamma ammino butryc acid       No current facility-administered medications on file prior to visit.      Social History     Tobacco Use    Smoking status: Former     Current packs/day: 0.00     Average packs/day: 1 pack/day for 24.2 years (24.2 ttl pk-yrs)     Types: Cigarettes     Start date: 1975     Quit date: 10/10/2001     Years since quittin.1    Smokeless tobacco: Never    Tobacco comments:     21 yrs ago   Vaping Use    Vaping status: Never Used   Substance and  "Sexual Activity    Alcohol use: Yes     Alcohol/week: 1.0 standard drink of alcohol     Types: 1 Cans of beer per week     Comment: Rarely drink, usually once every few weeks at most.    Drug use: No    Sexual activity: Yes     Partners: Female     Birth control/protection: None       Objective   /68 (BP Location: Left arm, Patient Position: Sitting, Cuff Size: Standard)   Pulse 65   Temp 98.9 °F (37.2 °C) (Temporal)   Resp 18   Ht 5' 9\" (1.753 m)   Wt 77.6 kg (171 lb)   SpO2 98%   BMI 25.25 kg/m²     Physical Exam  Vitals and nursing note reviewed.   Constitutional:       General: He is not in acute distress.     Appearance: Normal appearance. He is normal weight. He is not ill-appearing, toxic-appearing or diaphoretic.   HENT:      Head: Normocephalic and atraumatic.      Right Ear: Tympanic membrane, ear canal and external ear normal. There is no impacted cerumen.      Left Ear: Tympanic membrane, ear canal and external ear normal. There is no impacted cerumen.      Nose: Nose normal. No congestion or rhinorrhea.      Mouth/Throat:      Mouth: Mucous membranes are moist.      Pharynx: Oropharynx is clear. No oropharyngeal exudate or posterior oropharyngeal erythema.   Eyes:      General: No scleral icterus.        Right eye: No discharge.         Left eye: No discharge.      Extraocular Movements: Extraocular movements intact.      Conjunctiva/sclera: Conjunctivae normal.      Pupils: Pupils are equal, round, and reactive to light.   Neck:      Vascular: No carotid bruit.   Cardiovascular:      Rate and Rhythm: Normal rate and regular rhythm.      Pulses: Normal pulses.      Heart sounds: Normal heart sounds. No murmur heard.     No friction rub. No gallop.   Pulmonary:      Effort: Pulmonary effort is normal. No respiratory distress.      Breath sounds: Normal breath sounds. No wheezing or rales.   Abdominal:      General: Abdomen is flat. Bowel sounds are normal. There is no distension.      " Palpations: Abdomen is soft. There is no mass.      Tenderness: There is no abdominal tenderness. There is no guarding.      Hernia: No hernia is present.   Musculoskeletal:         General: No swelling, tenderness, deformity or signs of injury. Normal range of motion.      Cervical back: Normal range of motion and neck supple. No rigidity. No muscular tenderness.      Right lower leg: No edema.      Left lower leg: No edema.   Lymphadenopathy:      Cervical: No cervical adenopathy.   Skin:     General: Skin is warm and dry.      Capillary Refill: Capillary refill takes less than 2 seconds.      Coloration: Skin is not jaundiced or pale.      Findings: No bruising, erythema, lesion or rash.   Neurological:      General: No focal deficit present.      Mental Status: He is alert and oriented to person, place, and time. Mental status is at baseline.      Cranial Nerves: No cranial nerve deficit.      Sensory: No sensory deficit.      Motor: No weakness.      Coordination: Coordination normal.      Gait: Gait normal.      Deep Tendon Reflexes: Reflexes normal.   Psychiatric:         Mood and Affect: Mood normal.         Behavior: Behavior normal.         Thought Content: Thought content normal.         Judgment: Judgment normal.       Administrative Statements   I have spent a total time of 30 minutes in caring for this patient on the day of the visit/encounter including Diagnostic results, Prognosis, Risks and benefits of tx options, Instructions for management, Patient and family education, Importance of tx compliance, Risk factor reductions, Impressions, Counseling / Coordination of care, Documenting in the medical record, Reviewing / ordering tests, medicine, procedures  , and Obtaining or reviewing history  .

## 2024-11-15 NOTE — ASSESSMENT & PLAN NOTE
Discussed continuing diet and exercise.   He is up to date on diabetes and cardiovascular screening.   Discussed skin cancer screening.

## 2025-05-02 ENCOUNTER — NURSE TRIAGE (OUTPATIENT)
Age: 65
End: 2025-05-02

## 2025-05-02 NOTE — TELEPHONE ENCOUNTER
"FOLLOW UP: [facility] scheduled for 5/6 at 1:40pm    REASON FOR CONVERSATION: Dizziness    SYMPTOMS: Patient stated he has had ongoing vertigo intermittent for 15 years. Today had a severe episode that is now resolved. Describes the episode as a \"bad carnival ride\" and the room is spinning. Denies lightheadedness or feelings of passing out. Appt is scheduled for Tuesday. Patient agreeable to treatment plan.    OTHER: N/A    DISPOSITION: See Within 2 Weeks in Office    Reason for Disposition   Dizziness not present now, but is a chronic symptom (recurrent or ongoing AND lasting > 4 weeks)    Answer Assessment - Initial Assessment Questions  1. DESCRIPTION: \"Describe your dizziness.\"      Feels like he's on a \"bad carnival ride\"  2. LIGHTHEADED: \"Do you feel lightheaded?\" (e.g., somewhat faint, woozy, weak upon standing)      Denies  3. VERTIGO: \"Do you feel like either you or the room is spinning or tilting?\" (i.e., vertigo)      Room is spinning  4. SEVERITY: \"How bad is it?\"  \"Do you feel like you are going to faint?\" \"Can you stand and walk?\"      Denies  5. ONSET:  \"When did the dizziness begin?\"      15 years ago  6. AGGRAVATING FACTORS: \"Does anything make it worse?\" (e.g., standing, change in head position)      Unsure   7. HEART RATE: \"Can you tell me your heart rate?\" \"How many beats in 15 seconds?\"  (Note: Not all patients can do this.)        Unable to do  8. CAUSE: \"What do you think is causing the dizziness?\" (e.g., decreased fluids or food, diarrhea, emotional distress, heat exposure, new medicine, sudden standing, vomiting; unknown)      Unsure   9. RECURRENT SYMPTOM: \"Have you had dizziness before?\" If Yes, ask: \"When was the last time?\" \"What happened that time?\"      Yes, ongoing for years  10. OTHER SYMPTOMS: \"Do you have any other symptoms?\" (e.g., fever, chest pain, vomiting, diarrhea, bleeding)        Shaky, occasionally waking up in the middle of the night and feeling \"claustrophobic\", " headache    Protocols used: Dizziness-Adult-[place]

## 2025-05-06 ENCOUNTER — OFFICE VISIT (OUTPATIENT)
Dept: INTERNAL MEDICINE CLINIC | Facility: OTHER | Age: 65
End: 2025-05-06
Payer: COMMERCIAL

## 2025-05-06 VITALS
TEMPERATURE: 98 F | WEIGHT: 171 LBS | DIASTOLIC BLOOD PRESSURE: 74 MMHG | OXYGEN SATURATION: 99 % | HEIGHT: 69 IN | SYSTOLIC BLOOD PRESSURE: 120 MMHG | HEART RATE: 70 BPM | BODY MASS INDEX: 25.33 KG/M2

## 2025-05-06 DIAGNOSIS — H93.13 TINNITUS OF BOTH EARS: Primary | ICD-10-CM

## 2025-05-06 DIAGNOSIS — F51.01 PRIMARY INSOMNIA: ICD-10-CM

## 2025-05-06 DIAGNOSIS — R42 VERTIGO: ICD-10-CM

## 2025-05-06 PROCEDURE — 99214 OFFICE O/P EST MOD 30 MIN: CPT

## 2025-05-06 NOTE — PROGRESS NOTES
"Name: Robb Casiano      : 1960      MRN: [id number]  Encounter Provider: Sheree Manzo PA-C  Encounter Date: 2025   Encounter department: [facility]  :  Assessment & Plan  Tinnitus of both ears  Intermittent tinnitus of bilateral ears.  Notes he did wear a headset for many years to the right side  Recommend audiology evaluation, patient declines  Orders:    MRI brain wo contrast; Future    Vertigo  Patient with episodes of intermittent vertigo over the last 15 years associated with tinnitus.  Notes he did have an episode several days ago which is now resolved  Patient declines medications for vertigo  Neuroexam unremarkable today  Discussed likely causes of his vertigo including BPPV.  Recommend vestibular therapy, patient declines  Patient is adamantly requesting MRI of his brain.  Discussed with patient that his neurological exam is unremarkable today as well as his symptoms are resolved, therefore imaging is not necessarily indicated at this time.  Patient is aware if this and request that orders be placed regardless.  He is aware that insurance may deny the imaging study  Orders:    MRI brain wo contrast; Future    Primary insomnia  Patient with a history of circadian rhythm sleep disorder  He notes difficulty with falling asleep, will describe episodes of waking up with \"lightning\" sensation.  He does report he has a history of waking up with panic symptoms, but denies history of anxiety or depression  Advised patient to schedule sleep medicine, he declines referral at this time as he states he would \"never take sleep medication\"                History of Present Illness   Patient is a 64-year-old male presents for concerns of vertigo symptoms  He reports he had an episode of vertigo on 2025, which have resolved at this time.  He described the episode as a \"bad carnival ride\" and the room was spinning  He stated the symptoms were worst while lying " "down, symptoms did get better with getting up. He did go for a run after and felt improved. Slight \"grogginess\" while running  Patient denied lightheadedness or feelings of syncope.  He did endorse tinnitus at the time of symptoms, this also resolved  He had one day of severe symptoms, then symptoms improved but remained intermittent for additional 2-3 days   Patient does have a history of vertigo intermittent over the last 15 years. In the past has not tried medication, refuses medication for vertigo  He also notes longstanding history of tinnitus.  He reports that he did wear a headset for many years to his right ear    Patient believes that his symptoms may be triggered by shingles.  He denies rashes today  Patient reports that he does have intermittent generalized nerve pain that moves throughout the body          Review of Systems   Constitutional:  Negative for chills, fatigue and fever.   HENT:  Positive for tinnitus (resolved). Negative for congestion, ear pain, postnasal drip, rhinorrhea, sinus pressure, sore throat and trouble swallowing.    Eyes:  Negative for pain and visual disturbance.   Respiratory:  Negative for cough, chest tightness, shortness of breath and wheezing.    Cardiovascular:  Negative for chest pain, palpitations and leg swelling.   Gastrointestinal:  Negative for abdominal pain, blood in stool, constipation, diarrhea, nausea and vomiting.   Musculoskeletal:  Negative for neck pain.   Neurological:  Positive for dizziness (resolved). Negative for weakness, light-headedness, numbness and headaches.   Psychiatric/Behavioral:  Negative for dysphoric mood and sleep disturbance. The patient is not nervous/anxious.    All other systems reviewed and are negative.      Objective   /74 (BP Location: Left arm, Patient Position: Sitting, Cuff Size: Adult)   Pulse 70   Temp 98 °F (36.7 °C)   Ht 5' 9\" (1.753 m)   Wt 77.6 kg (171 lb)   SpO2 99%   BMI 25.25 kg/m²      Physical Exam  Vitals " and nursing note reviewed.   Constitutional:       General: He is not in acute distress.     Appearance: Normal appearance. He is not ill-appearing.   HENT:      Head: Normocephalic and atraumatic.      Right Ear: Tympanic membrane, ear canal and external ear normal.      Left Ear: Tympanic membrane, ear canal and external ear normal.      Nose: Nose normal. No rhinorrhea.      Mouth/Throat:      Mouth: Mucous membranes are moist.      Pharynx: Oropharynx is clear. No oropharyngeal exudate or posterior oropharyngeal erythema.   Eyes:      General: No scleral icterus.     Extraocular Movements: Extraocular movements intact.      Right eye: No nystagmus.      Left eye: No nystagmus.      Conjunctiva/sclera: Conjunctivae normal.      Pupils: Pupils are equal, round, and reactive to light.   Cardiovascular:      Rate and Rhythm: Normal rate and regular rhythm.      Heart sounds: Normal heart sounds. No murmur heard.     No friction rub. No gallop.   Pulmonary:      Effort: Pulmonary effort is normal. No respiratory distress.      Breath sounds: Normal breath sounds. No wheezing, rhonchi or rales.   Chest:      Chest wall: No tenderness.   Musculoskeletal:      Cervical back: Normal range of motion. No tenderness.      Right lower leg: No edema.      Left lower leg: No edema.   Lymphadenopathy:      Cervical: No cervical adenopathy.   Skin:     General: Skin is warm and dry.      Coloration: Skin is not pale.      Findings: No erythema, lesion or rash.   Neurological:      General: No focal deficit present.      Mental Status: He is alert and oriented to person, place, and time.      Cranial Nerves: Cranial nerves 2-12 are intact. No cranial nerve deficit or facial asymmetry.      Sensory: Sensation is intact.      Motor: Motor function is intact. No pronator drift.      Coordination: Coordination is intact. Finger-Nose-Finger Test and Heel to Shin Test normal.      Gait: Gait is intact.   Psychiatric:         Mood and  Affect: Mood normal.         Behavior: Behavior normal.       I have spent a total time of 32 minutes in caring for this patient on the day of the visit/encounter including Prognosis, Risks and benefits of tx options, Instructions for management, Patient and family education, Importance of tx compliance, Risk factor reductions, Impressions, Documenting in the medical record, Reviewing/placing orders in the medical record (including tests, medications, and/or procedures), and Obtaining or reviewing history  .      Disclaimer: This note was generated with voice recognition software.  Phonetic, grammatical, and spelling errors may be present as a result.  Please contact provider with any concerns or questions

## 2025-05-06 NOTE — ASSESSMENT & PLAN NOTE
"Patient with a history of circadian rhythm sleep disorder  He notes difficulty with falling asleep, will describe episodes of waking up with \"lightning\" sensation.  He does report he has a history of waking up with panic symptoms, but denies history of anxiety or depression  Advised patient to schedule sleep medicine, he declines referral at this time as he states he would \"never take sleep medication\"         "

## 2025-07-09 ENCOUNTER — HOSPITAL ENCOUNTER (OUTPATIENT)
Dept: MRI IMAGING | Facility: HOSPITAL | Age: 65
Discharge: HOME | End: 2025-07-09
Payer: COMMERCIAL

## 2025-07-09 DIAGNOSIS — R42 VERTIGO: ICD-10-CM

## 2025-07-09 DIAGNOSIS — H93.13 TINNITUS OF BOTH EARS: ICD-10-CM

## 2025-07-09 PROCEDURE — 70551 MRI BRAIN STEM W/O DYE: CPT
